# Patient Record
Sex: FEMALE | Race: WHITE | NOT HISPANIC OR LATINO | Employment: OTHER | URBAN - METROPOLITAN AREA
[De-identification: names, ages, dates, MRNs, and addresses within clinical notes are randomized per-mention and may not be internally consistent; named-entity substitution may affect disease eponyms.]

---

## 2017-11-03 ENCOUNTER — TRANSCRIBE ORDERS (OUTPATIENT)
Dept: ADMINISTRATIVE | Facility: HOSPITAL | Age: 67
End: 2017-11-03

## 2017-11-03 ENCOUNTER — APPOINTMENT (OUTPATIENT)
Dept: LAB | Facility: HOSPITAL | Age: 67
End: 2017-11-03
Payer: MEDICARE

## 2017-11-03 DIAGNOSIS — M25.512 LEFT SHOULDER PAIN, UNSPECIFIED CHRONICITY: ICD-10-CM

## 2017-11-03 DIAGNOSIS — E03.8 HYPOTHYROIDISM DUE TO FIBROUS INVASIVE THYROIDITIS: ICD-10-CM

## 2017-11-03 DIAGNOSIS — E06.5 HYPOTHYROIDISM DUE TO FIBROUS INVASIVE THYROIDITIS: ICD-10-CM

## 2017-11-03 DIAGNOSIS — M25.571 RIGHT ANKLE PAIN, UNSPECIFIED CHRONICITY: ICD-10-CM

## 2017-11-03 DIAGNOSIS — G54.6 PHANTOM PAIN (HCC): ICD-10-CM

## 2017-11-03 DIAGNOSIS — E55.9 AVITAMINOSIS D: ICD-10-CM

## 2017-11-03 DIAGNOSIS — M25.541 ARTHRALGIA OF RIGHT HAND: ICD-10-CM

## 2017-11-03 DIAGNOSIS — M25.542 ARTHRALGIA OF LEFT HAND: ICD-10-CM

## 2017-11-03 DIAGNOSIS — G54.6 PHANTOM PAIN (HCC): Primary | ICD-10-CM

## 2017-11-03 DIAGNOSIS — M25.572 LEFT ANKLE PAIN, UNSPECIFIED CHRONICITY: ICD-10-CM

## 2017-11-03 LAB
ALBUMIN SERPL BCP-MCNC: 3.7 G/DL (ref 3.5–5)
ALP SERPL-CCNC: 97 U/L (ref 46–116)
ALT SERPL W P-5'-P-CCNC: 29 U/L (ref 12–78)
ANION GAP SERPL CALCULATED.3IONS-SCNC: 8 MMOL/L (ref 4–13)
AST SERPL W P-5'-P-CCNC: 14 U/L (ref 5–45)
BILIRUB SERPL-MCNC: 0.3 MG/DL (ref 0.2–1)
BUN SERPL-MCNC: 12 MG/DL (ref 5–25)
CALCIUM SERPL-MCNC: 8.9 MG/DL (ref 8.3–10.1)
CHLORIDE SERPL-SCNC: 105 MMOL/L (ref 100–108)
CO2 SERPL-SCNC: 32 MMOL/L (ref 21–32)
CREAT SERPL-MCNC: 0.88 MG/DL (ref 0.6–1.3)
ERYTHROCYTE [SEDIMENTATION RATE] IN BLOOD: 7 MM/HOUR (ref 0–20)
GFR SERPL CREATININE-BSD FRML MDRD: 69 ML/MIN/1.73SQ M
GLUCOSE P FAST SERPL-MCNC: 90 MG/DL (ref 65–99)
POTASSIUM SERPL-SCNC: 4.2 MMOL/L (ref 3.5–5.3)
PROT SERPL-MCNC: 7.3 G/DL (ref 6.4–8.2)
SODIUM SERPL-SCNC: 145 MMOL/L (ref 136–145)
TSH SERPL DL<=0.05 MIU/L-ACNC: 1.87 UIU/ML (ref 0.36–3.74)

## 2017-11-03 PROCEDURE — 80053 COMPREHEN METABOLIC PANEL: CPT

## 2017-11-03 PROCEDURE — 85652 RBC SED RATE AUTOMATED: CPT

## 2017-11-03 PROCEDURE — 36415 COLL VENOUS BLD VENIPUNCTURE: CPT

## 2017-11-03 PROCEDURE — 84443 ASSAY THYROID STIM HORMONE: CPT

## 2017-11-06 ENCOUNTER — HOSPITAL ENCOUNTER (EMERGENCY)
Facility: HOSPITAL | Age: 67
Discharge: HOME | End: 2017-11-06
Admitting: EMERGENCY MEDICINE
Payer: MEDICARE

## 2017-11-06 VITALS
HEART RATE: 79 BPM | SYSTOLIC BLOOD PRESSURE: 162 MMHG | WEIGHT: 168.13 LBS | TEMPERATURE: 98.7 F | OXYGEN SATURATION: 96 % | DIASTOLIC BLOOD PRESSURE: 89 MMHG | RESPIRATION RATE: 20 BRPM

## 2017-11-06 DIAGNOSIS — T18.9XXA FOREIGN BODY, SWALLOWED, INITIAL ENCOUNTER: Primary | ICD-10-CM

## 2017-11-06 LAB
AMPHETAMINES SERPL QL SCN: NEGATIVE
BARBITURATES UR QL: NEGATIVE
BENZODIAZ UR QL: NEGATIVE
COCAINE UR QL: NEGATIVE
METHADONE UR QL: NEGATIVE
OPIATES UR QL SCN: NEGATIVE
PCP UR QL: NEGATIVE
THC UR QL: NEGATIVE

## 2017-11-06 PROCEDURE — 80307 DRUG TEST PRSMV CHEM ANLYZR: CPT | Performed by: NURSE PRACTITIONER

## 2017-11-06 PROCEDURE — 99283 EMERGENCY DEPT VISIT LOW MDM: CPT

## 2017-11-06 RX ORDER — MONTELUKAST SODIUM 10 MG/1
10 TABLET ORAL
COMMUNITY
End: 2021-11-23

## 2017-11-06 RX ORDER — LISINOPRIL 5 MG/1
10 TABLET ORAL DAILY
COMMUNITY
End: 2021-11-23

## 2017-11-06 RX ORDER — IBUPROFEN 600 MG/1
600 TABLET ORAL ONCE
Status: COMPLETED | OUTPATIENT
Start: 2017-11-06 | End: 2017-11-06

## 2017-11-06 RX ORDER — MAG HYDROX/ALUMINUM HYD/SIMETH 400-400-40
1 SUSPENSION, ORAL (FINAL DOSE FORM) ORAL DAILY
COMMUNITY
Start: 2015-08-03

## 2017-11-06 RX ADMIN — IBUPROFEN 600 MG: 600 TABLET ORAL at 10:41

## 2017-11-06 NOTE — ED PROVIDER NOTES
History  Chief Complaint   Patient presents with    Swallowed Foreign Body     Pt presents to ER with c/o's opening a new bottle of soda & an unknow gelatin type capsule was in bottle & pt had ingested some of the capsule  Healthy appearing adult presents in no distress   70-year-old female who is presenting here today with a chief complaint of believing that there was some type of gelatin capsule that was in her [employer]  She is here to find out what that was  She is complaining of beginning to feel a headache  She is requesting urine drug screen although I think this is likely not going to be helpful  She denies any medication consumption that may have appeared like this  This was just prior to arrival and she did bring the capsule with her  Prior to Admission Medications   Prescriptions Last Dose Informant Patient Reported? Taking? Cholecalciferol (VITAMIN D3) 5000 units CAPS   Yes Yes   Sig: Take 1 capsule by mouth daily   lisinopril (ZESTRIL) 5 mg tablet   Yes Yes   Sig: Take 5 mg by mouth daily   montelukast (SINGULAIR) 10 mg tablet   Yes Yes   Sig: Take 10 mg by mouth daily at bedtime      Facility-Administered Medications: None       Past Medical History:   Diagnosis Date    COPD (chronic obstructive pulmonary disease) ([facility] [address] )     Hypertension        History reviewed  No pertinent surgical history  History reviewed  No pertinent family history  I have reviewed and agree with the history as documented  Social History   Substance Use Topics    Smoking status: Unknown If Ever Smoked    Smokeless tobacco: Not on file    Alcohol use Not on file        Review of Systems   Constitutional: Negative for activity change, fatigue and fever  HENT: Negative for congestion, ear pain, rhinorrhea and sore throat  Eyes: Negative  Respiratory: Negative for cough, shortness of breath and wheezing  Gastrointestinal: Negative for abdominal pain, diarrhea, nausea and vomiting  Endocrine: Negative  Genitourinary: Negative for difficulty urinating, dyspareunia, dysuria, flank pain, frequency, menstrual problem, pelvic pain, urgency, vaginal bleeding, vaginal discharge and vaginal pain  Musculoskeletal: Negative for arthralgias and myalgias  Skin: Negative for color change and pallor  Neurological: Negative for dizziness, speech difficulty, weakness and headaches  Hematological: Negative for adenopathy  Psychiatric/Behavioral: Negative for confusion  Physical Exam  ED Triage Vitals   Temperature Pulse Respirations Blood Pressure SpO2   11/06/17 0954 11/06/17 0954 11/06/17 0954 11/06/17 0956 11/06/17 0954   98 7 °F (37 1 °C) 79 20 (!) 171/86 96 %      Temp Source Heart Rate Source Patient Position - Orthostatic VS BP Location FiO2 (%)   11/06/17 0954 11/06/17 0954 -- 11/06/17 0956 --   Oral Monitor  Right arm       Pain Score       --                  Orthostatic Vital Signs  Vitals:    11/06/17 0954 11/06/17 0956 11/06/17 1051   BP:  (!) 171/86 162/89   Pulse: 79         Physical Exam   Constitutional: She is oriented to person, place, and time  She appears well-developed and well-nourished  She is cooperative  Non-toxic appearance  She does not have a sickly appearance  She does not appear ill  No distress  HENT:   Head: Normocephalic and atraumatic  Right Ear: Tympanic membrane and external ear normal    Left Ear: Tympanic membrane and external ear normal    Nose: No rhinorrhea, sinus tenderness or nasal deformity  No epistaxis  Right sinus exhibits no maxillary sinus tenderness and no frontal sinus tenderness  Left sinus exhibits no maxillary sinus tenderness and no frontal sinus tenderness  Mouth/Throat: Oropharynx is clear and moist and mucous membranes are normal  Normal dentition  Eyes: EOM are normal  Pupils are equal, round, and reactive to light  Neck: Normal range of motion  Neck supple     Cardiovascular: Normal rate, regular rhythm and normal heart sounds  No murmur heard  Pulmonary/Chest: Effort normal and breath sounds normal  No accessory muscle usage  No respiratory distress  She has no wheezes  She has no rales  She exhibits no tenderness  Abdominal: Soft  She exhibits no distension  There is no guarding  Musculoskeletal: Normal range of motion  She exhibits no edema or tenderness  Lymphadenopathy:     She has no cervical adenopathy  Neurological: She is alert and oriented to person, place, and time  She exhibits normal muscle tone  Skin: Skin is warm and dry  No rash noted  No erythema  Psychiatric: She has a normal mood and affect  Nursing note and vitals reviewed  ED Medications  Medications   ibuprofen (MOTRIN) tablet 600 mg (600 mg Oral Given 11/6/17 1041)       Diagnostic Studies  Results Reviewed     Procedure Component Value Units Date/Time    Rapid drug screen, urine [[id number]]  (Normal) Collected:  11/06/17 1036    Lab Status:  Final result Specimen:  Urine from Urine, Clean Catch Updated:  11/06/17 1058     Amph/Meth UR Negative     Barbiturate Ur Negative     Benzodiazepine Urine Negative     Cocaine Urine Negative     Methadone Urine Negative     Opiate Urine Negative     PCP Ur Negative     THC Urine Negative    Narrative:         FOR MEDICAL PURPOSES ONLY  IF CONFIRMATION NEEDED PLEASE CONTACT THE LAB WITHIN 5 DAYS  Drug Screen Cutoff Levels:  AMPHETAMINE/METHAMPHETAMINES  1000 ng/mL  BARBITURATES     200 ng/mL  BENZODIAZEPINES     200 ng/mL  COCAINE      300 ng/mL  METHADONE      300 ng/mL  OPIATES      300 ng/mL  PHENCYCLIDINE     25 ng/mL  THC       50 ng/mL                 No orders to display              Procedures  Procedures       Phone Contacts  ED Phone Contact    ED Course  ED Course                                MDM  Number of Diagnoses or Management Options  Foreign body, swallowed, initial encounter: new and requires workup  Diagnosis management comments:  It turns out that capsule that the patient found was her own vitamin-D capsule  Anthony placed one of the vitamin-D capsules into cola and it appears to be doing the same thing as the capsule that she brought in  CritCare Time    Disposition  Final diagnoses:   Foreign body, swallowed, initial encounter     Time reflects when diagnosis was documented in both MDM as applicable and the Disposition within this note     Time User Action Codes Description Comment    11/6/2017 10:48 AM Theodora Medicus  9XXA] Foreign body, swallowed, initial encounter       ED Disposition     ED Disposition Condition Comment    Discharge  Shellie Claude discharge to home/self care  Condition at discharge: Good        Follow-up Information    None       Discharge Medication List as of 11/6/2017 10:49 AM      CONTINUE these medications which have NOT CHANGED    Details   Cholecalciferol (VITAMIN D3) 5000 units CAPS Take 1 capsule by mouth daily, Starting Mon 8/3/2015, Historical Med      lisinopril (ZESTRIL) 5 mg tablet Take 5 mg by mouth daily, Historical Med      montelukast (SINGULAIR) 10 mg tablet Take 10 mg by mouth daily at bedtime, Historical Med           No discharge procedures on file      ED Provider  Electronically Signed by           BONI Dudley  11/06/17 6692

## 2020-07-23 ENCOUNTER — APPOINTMENT (OUTPATIENT)
Dept: LAB | Facility: HOSPITAL | Age: 70
End: 2020-07-23
Payer: MEDICARE

## 2020-07-23 ENCOUNTER — TRANSCRIBE ORDERS (OUTPATIENT)
Dept: ADMINISTRATIVE | Facility: HOSPITAL | Age: 70
End: 2020-07-23

## 2020-07-23 ENCOUNTER — HOSPITAL ENCOUNTER (OUTPATIENT)
Dept: RADIOLOGY | Facility: HOSPITAL | Age: 70
Discharge: HOME | End: 2020-07-23
Payer: MEDICARE

## 2020-07-23 DIAGNOSIS — F41.1 GENERALIZED ANXIETY DISORDER: ICD-10-CM

## 2020-07-23 DIAGNOSIS — Z13.6 SCREENING FOR CARDIOVASCULAR CONDITION: ICD-10-CM

## 2020-07-23 DIAGNOSIS — K59.09 OTHER CONSTIPATION: ICD-10-CM

## 2020-07-23 DIAGNOSIS — M25.562 LEFT KNEE PAIN, UNSPECIFIED CHRONICITY: ICD-10-CM

## 2020-07-23 DIAGNOSIS — F33.1 MAJOR DEPRESSIVE DISORDER, RECURRENT EPISODE, MODERATE (HCC): Primary | ICD-10-CM

## 2020-07-23 DIAGNOSIS — F33.1 MAJOR DEPRESSIVE DISORDER, RECURRENT EPISODE, MODERATE (HCC): ICD-10-CM

## 2020-07-23 LAB
ALBUMIN SERPL BCP-MCNC: 4.1 G/DL (ref 3.5–5)
ALP SERPL-CCNC: 79 U/L (ref 46–116)
ALT SERPL W P-5'-P-CCNC: 13 U/L (ref 12–78)
ANION GAP SERPL CALCULATED.3IONS-SCNC: 9 MMOL/L (ref 4–13)
AST SERPL W P-5'-P-CCNC: 11 U/L (ref 5–45)
BASOPHILS # BLD AUTO: 0.02 THOUSANDS/ΜL (ref 0–0.1)
BASOPHILS NFR BLD AUTO: 0 % (ref 0–1)
BILIRUB SERPL-MCNC: 0.4 MG/DL (ref 0.2–1)
BUN SERPL-MCNC: 14 MG/DL (ref 5–25)
CALCIUM SERPL-MCNC: 9 MG/DL (ref 8.3–10.1)
CHLORIDE SERPL-SCNC: 106 MMOL/L (ref 100–108)
CHOLEST SERPL-MCNC: 218 MG/DL (ref 50–200)
CO2 SERPL-SCNC: 27 MMOL/L (ref 21–32)
CREAT SERPL-MCNC: 0.85 MG/DL (ref 0.6–1.3)
EOSINOPHIL # BLD AUTO: 0.08 THOUSAND/ΜL (ref 0–0.61)
EOSINOPHIL NFR BLD AUTO: 1 % (ref 0–6)
ERYTHROCYTE [DISTWIDTH] IN BLOOD BY AUTOMATED COUNT: 12.9 % (ref 11.6–15.1)
GFR SERPL CREATININE-BSD FRML MDRD: 71 ML/MIN/1.73SQ M
GLUCOSE P FAST SERPL-MCNC: 90 MG/DL (ref 65–99)
HCT VFR BLD AUTO: 44.4 % (ref 34.8–46.1)
HDLC SERPL-MCNC: 47 MG/DL
HGB BLD-MCNC: 14.2 G/DL (ref 11.5–15.4)
IMM GRANULOCYTES # BLD AUTO: 0.02 THOUSAND/UL (ref 0–0.2)
IMM GRANULOCYTES NFR BLD AUTO: 0 % (ref 0–2)
LDLC SERPL CALC-MCNC: 137 MG/DL (ref 0–100)
LYMPHOCYTES # BLD AUTO: 2.23 THOUSANDS/ΜL (ref 0.6–4.47)
LYMPHOCYTES NFR BLD AUTO: 37 % (ref 14–44)
MCH RBC QN AUTO: 31.6 PG (ref 26.8–34.3)
MCHC RBC AUTO-ENTMCNC: 32 G/DL (ref 31.4–37.4)
MCV RBC AUTO: 99 FL (ref 82–98)
MONOCYTES # BLD AUTO: 0.39 THOUSAND/ΜL (ref 0.17–1.22)
MONOCYTES NFR BLD AUTO: 7 % (ref 4–12)
NEUTROPHILS # BLD AUTO: 3.26 THOUSANDS/ΜL (ref 1.85–7.62)
NEUTS SEG NFR BLD AUTO: 55 % (ref 43–75)
NONHDLC SERPL-MCNC: 171 MG/DL
NRBC BLD AUTO-RTO: 0 /100 WBCS
PLATELET # BLD AUTO: 319 THOUSANDS/UL (ref 149–390)
PMV BLD AUTO: 9.6 FL (ref 8.9–12.7)
POTASSIUM SERPL-SCNC: 4.2 MMOL/L (ref 3.5–5.3)
PROT SERPL-MCNC: 7.9 G/DL (ref 6.4–8.2)
RBC # BLD AUTO: 4.5 MILLION/UL (ref 3.81–5.12)
SODIUM SERPL-SCNC: 142 MMOL/L (ref 136–145)
TRIGL SERPL-MCNC: 169 MG/DL
TSH SERPL DL<=0.05 MIU/L-ACNC: 1.29 UIU/ML (ref 0.36–3.74)
WBC # BLD AUTO: 6 THOUSAND/UL (ref 4.31–10.16)

## 2020-07-23 PROCEDURE — 73564 X-RAY EXAM KNEE 4 OR MORE: CPT

## 2020-07-23 PROCEDURE — 80053 COMPREHEN METABOLIC PANEL: CPT

## 2020-07-23 PROCEDURE — 36415 COLL VENOUS BLD VENIPUNCTURE: CPT

## 2020-07-23 PROCEDURE — 80061 LIPID PANEL: CPT

## 2020-07-23 PROCEDURE — 85025 COMPLETE CBC W/AUTO DIFF WBC: CPT

## 2020-07-23 PROCEDURE — 84443 ASSAY THYROID STIM HORMONE: CPT

## 2020-07-24 ENCOUNTER — HOSPITAL ENCOUNTER (EMERGENCY)
Facility: HOSPITAL | Age: 70
Discharge: HOME | End: 2020-07-24
Attending: EMERGENCY MEDICINE | Admitting: EMERGENCY MEDICINE
Payer: MEDICARE

## 2020-07-24 VITALS
RESPIRATION RATE: 18 BRPM | OXYGEN SATURATION: 97 % | SYSTOLIC BLOOD PRESSURE: 124 MMHG | HEIGHT: 64 IN | WEIGHT: 160.72 LBS | TEMPERATURE: 97.9 F | DIASTOLIC BLOOD PRESSURE: 99 MMHG | HEART RATE: 84 BPM | BODY MASS INDEX: 27.44 KG/M2

## 2020-07-24 DIAGNOSIS — S82.002A LEFT PATELLA FRACTURE: Primary | ICD-10-CM

## 2020-07-24 PROCEDURE — 99283 EMERGENCY DEPT VISIT LOW MDM: CPT

## 2020-07-24 PROCEDURE — 99284 EMERGENCY DEPT VISIT MOD MDM: CPT | Performed by: PHYSICIAN ASSISTANT

## 2020-07-24 NOTE — DISCHARGE INSTRUCTIONS
Use crutches and knee immobilizer until seen by orthopedics  Take Tylenol as needed for pain  Call orthopedics on Monday to schedule a follow-up appointment  Return to ER with worsening symptoms

## 2020-07-24 NOTE — ED PROVIDER NOTES
History  Chief Complaint   Patient presents with    Knee Injury     Patient presents to ED with co left knee pain after falling 2 weeks ago  Patient reports she had xray done yesterday and was called and told today that she fractured her knee to go to ER  66yo female with a history of hypertension and COPD presenting for evaluation of left knee pain x 2 weeks  Patient fell on her left knee two weeks ago  She describes "tweaking it" a few days later walking up the steps  Patient has been able to ambulate since that time but pain and swelling continued  She was seen by her PCP 2 days ago for the same  Patient was sent for x-rays  X-ray revealed a nondisplaced anterior patella fracture  Patient was referred to the ER for further management  She denies numbness, tingling, and other injuries  History provided by:  Patient and medical records   used: No    Knee Pain   Location:  Knee  Time since incident:  2 weeks  Injury: yes    Mechanism of injury: fall    Fall:     Fall occurred:  Walking    Height of fall:  Ground level    Point of impact:  Knees  Knee location:  L knee  Pain details:     Quality:  Aching    Radiates to:  Does not radiate    Severity:  Mild    Onset quality:  Gradual    Duration:  2 weeks    Timing:  Constant    Progression:  Unchanged  Chronicity:  New  Dislocation: no    Prior injury to area:  No  Relieved by:  Nothing  Worsened by:  Nothing  Ineffective treatments:  None tried  Associated symptoms: swelling    Associated symptoms: no back pain, no decreased ROM, no fatigue, no fever, no itching, no muscle weakness, no neck pain, no numbness, no stiffness and no tingling        Prior to Admission Medications   Prescriptions Last Dose Informant Patient Reported? Taking?    Cholecalciferol (VITAMIN D3) 5000 units CAPS 7/24/2020 at Unknown time  Yes Yes   Sig: Take 1 capsule by mouth daily   lisinopril (ZESTRIL) 5 mg tablet 7/24/2020 at Unknown time  Yes Yes   Sig: Take 5 mg by mouth daily   montelukast (SINGULAIR) 10 mg tablet Not Taking at Unknown time  Yes No   Sig: Take 10 mg by mouth daily at bedtime      Facility-Administered Medications: None       Past Medical History:   Diagnosis Date    COPD [facility]chronic obstructive pulmonary disease) ([facility] [address] )     Hypertension        History reviewed  No pertinent surgical history  History reviewed  No pertinent family history  I have reviewed and agree with the history as documented  E-Cigarette/Vaping    E-Cigarette Use Never User      E-Cigarette/Vaping Substances     Social History     Tobacco Use    Smoking status: Unknown If Ever Smoked    Smokeless tobacco: Never Used   Substance Use Topics    Alcohol use: Not Currently    Drug use: Not Currently       Review of Systems   Constitutional: Negative for chills, fatigue and fever  Eyes: Negative for discharge and redness  Respiratory: Negative for shortness of breath and stridor  Cardiovascular: Negative for chest pain and palpitations  Gastrointestinal: Negative for abdominal pain and vomiting  Musculoskeletal: Positive for arthralgias  Negative for back pain, neck pain and stiffness  Skin: Negative for color change, itching and wound  Neurological: Negative for weakness and numbness  Psychiatric/Behavioral: Negative for confusion  All other systems reviewed and are negative  Physical Exam  Physical Exam   Constitutional: She appears well-developed and well-nourished  No distress  Non-toxic appearing   HENT:   Head: Normocephalic and atraumatic  Right Ear: External ear normal    Left Ear: External ear normal    Nose: Nose normal    Mouth/Throat: Oropharynx is clear and moist    Eyes: Conjunctivae are normal  Right eye exhibits no discharge  Left eye exhibits no discharge  No scleral icterus  Neck: Normal range of motion  Neck supple  Cardiovascular: Normal rate, regular rhythm and normal heart sounds  No murmur heard    Pulmonary/Chest: Effort normal and breath sounds normal  No stridor  No respiratory distress  She has no wheezes  She has no rales  Abdominal: Soft  Bowel sounds are normal  She exhibits no distension  There is no tenderness  There is no guarding  Musculoskeletal: Normal range of motion  She exhibits no edema or deformity  Left knee: She exhibits swelling  She exhibits normal range of motion, no deformity, no laceration and no erythema  Left knee: Pain at patella with mild soft tissue swelling  ROM intact  No deformities or lacerations  Able to ambulate without difficulty  2+ DP pulse  Distal sensation intact  Lymphadenopathy:     She has no cervical adenopathy  Neurological: She is alert  She is not disoriented  GCS eye subscore is 4  GCS verbal subscore is 5  GCS motor subscore is 6  Skin: Skin is warm and dry  She is not diaphoretic  Psychiatric: She has a normal mood and affect  Her behavior is normal    Nursing note and vitals reviewed  Vital Signs  ED Triage Vitals [07/24/20 1857]   Temperature Pulse Respirations Blood Pressure SpO2   97 9 °F (36 6 °C) 84 18 124/99 97 %      Temp Source Heart Rate Source Patient Position - Orthostatic VS BP Location FiO2 (%)   Oral Monitor Sitting Left arm --      Pain Score       --           Vitals:    07/24/20 1857   BP: 124/99   Pulse: 84   Patient Position - Orthostatic VS: Sitting         Visual Acuity      ED Medications  Medications - No data to display    Diagnostic Studies  Results Reviewed     None                 No orders to display              Procedures  Procedures         ED Course       US AUDIT      Most Recent Value   Initial Alcohol Screen: US AUDIT-C    1  How often do you have a drink containing alcohol?  0 Filed at: 07/24/2020 1911   2  How many drinks containing alcohol do you have on a typical day you are drinking? 0 Filed at: 07/24/2020 1911   3b  FEMALE Any Age, or MALE 65+: How often do you have 4 or more drinks on one occassion?   0 Filed at: 07/24/2020 1911   Audit-C Score  0 Filed at: 07/24/2020 1911              Identification of Seniors at Risk      Most Recent Value   (ISAR) Identification of Seniors at Risk   Before the illness or injury that brought you to the Emergency, did you need someone to help you on a regular basis? 0 Filed at: 07/24/2020 1900   In the last 24 hours, have you needed more help than usual?  0 Filed at: 07/24/2020 1900   Have you been hospitalized for one or more nights during the past 6 months? 0 Filed at: 07/24/2020 1900   In general, do you see well?  0 Filed at: 07/24/2020 1900   In general, do you have serious problems with your memory? 0 Filed at: 07/24/2020 1900   Do you take more than three different medications every day? 1 Filed at: 07/24/2020 1900   ISAR Score  1 Filed at: 07/24/2020 1900          SARAH/DAST-10      Most Recent Value   How many times in the past year have you    Used an illegal drug or used a prescription medication for non-medical reasons? Never Filed at: 07/24/2020 1911                                MDM  Number of Diagnoses or Management Options  Left patella fracture: new and does not require workup  Diagnosis management comments: 72-year-old female presenting for left knee pain x2 weeks after a fall  She was sent for outpatient x-rays yesterday  X-rays revealed a nondisplaced patellar fracture  She was referred to the ER for further evaluation  On exam, there is mild pain on palpation of the patella with mild soft tissue swelling  Range of motion is normal   Patient is able to bear weight without difficulty  Extremity is neurovascularly intact  Knee immobilizer and crutches provided  Advised orthopedics follow-up for further management  ED return precautions discussed  Patient expressed understanding and is agreeable to plan  Patient discharged in stable condition           Amount and/or Complexity of Data Reviewed  Tests in the radiology section of CPT®: reviewed  Independent visualization of images, tracings, or specimens: yes    Risk of Complications, Morbidity, and/or Mortality  Presenting problems: low  Diagnostic procedures: low  Management options: low    Patient Progress  Patient progress: stable        Disposition  Final diagnoses:   Left patella fracture     Time reflects when diagnosis was documented in both MDM as applicable and the Disposition within this note     Time User Action Codes Description Comment    7/24/2020  7:08  [facility] [address], [address] Left patella fracture       ED Disposition     ED Disposition Condition Date/Time Comment    Discharge Stable Fri Jul 24, 2020  7:08 PM Renee Nicholson discharge to home/self care  Follow-up Information     Follow up With Specialties Details Why Contact Info Additional [address] [facility], [place] Orthopedic Surgery Schedule an appointment as soon as possible for a visit   [place] [address]  [address]  [facility] [phone]       [facility] Emergency Department Emergency Medicine  If symptoms worsen [address] [facility] [id number]  [address] [place] [address] ED, [address], [address]          Discharge Medication List as of 7/24/2020  7:09 PM      CONTINUE these medications which have NOT CHANGED    Details   Cholecalciferol (VITAMIN D3) 5000 units CAPS Take 1 capsule by mouth daily, Starting Mon 8/3/2015, Historical Med      lisinopril (ZESTRIL) 5 mg tablet Take 5 mg by mouth daily, Historical Med      montelukast (SINGULAIR) 10 mg tablet Take 10 mg by mouth daily at bedtime, Historical Med           No discharge procedures on file      PDMP Review     None          ED Provider  Electronically Signed by           Lilli Mayo PA-C  07/24/20 1142

## 2021-08-23 ENCOUNTER — APPOINTMENT (EMERGENCY)
Dept: RADIOLOGY | Facility: HOSPITAL | Age: 71
End: 2021-08-23
Payer: MEDICARE

## 2021-08-23 ENCOUNTER — HOSPITAL ENCOUNTER (EMERGENCY)
Facility: HOSPITAL | Age: 71
Discharge: HOME | End: 2021-08-23
Attending: EMERGENCY MEDICINE | Admitting: EMERGENCY MEDICINE
Payer: MEDICARE

## 2021-08-23 VITALS
OXYGEN SATURATION: 98 % | RESPIRATION RATE: 20 BRPM | HEART RATE: 67 BPM | TEMPERATURE: 97.8 F | DIASTOLIC BLOOD PRESSURE: 98 MMHG | SYSTOLIC BLOOD PRESSURE: 168 MMHG

## 2021-08-23 DIAGNOSIS — R07.9 CHEST PAIN: Primary | ICD-10-CM

## 2021-08-23 DIAGNOSIS — F41.9 ANXIETY: ICD-10-CM

## 2021-08-23 LAB
ALBUMIN SERPL BCP-MCNC: 3.2 G/DL (ref 3.5–5)
ALP SERPL-CCNC: 83 U/L (ref 46–116)
ALT SERPL W P-5'-P-CCNC: 23 U/L (ref 12–78)
ANION GAP SERPL CALCULATED.3IONS-SCNC: 5 MMOL/L (ref 4–13)
AST SERPL W P-5'-P-CCNC: 10 U/L (ref 5–45)
ATRIAL RATE: 65 BPM
BASOPHILS # BLD MANUAL: 0 THOUSAND/UL (ref 0–0.1)
BASOPHILS NFR MAR MANUAL: 0 % (ref 0–1)
BILIRUB SERPL-MCNC: 0.22 MG/DL (ref 0.2–1)
BUN SERPL-MCNC: 15 MG/DL (ref 5–25)
CALCIUM ALBUM COR SERPL-MCNC: 9.1 MG/DL (ref 8.3–10.1)
CALCIUM SERPL-MCNC: 8.5 MG/DL (ref 8.3–10.1)
CHLORIDE SERPL-SCNC: 104 MMOL/L (ref 100–108)
CO2 SERPL-SCNC: 34 MMOL/L (ref 21–32)
CREAT SERPL-MCNC: 1.01 MG/DL (ref 0.6–1.3)
EOSINOPHIL # BLD MANUAL: 0 THOUSAND/UL (ref 0–0.4)
EOSINOPHIL NFR BLD MANUAL: 0 % (ref 0–6)
ERYTHROCYTE [DISTWIDTH] IN BLOOD BY AUTOMATED COUNT: 12.8 % (ref 11.6–15.1)
GFR SERPL CREATININE-BSD FRML MDRD: 57 ML/MIN/1.73SQ M
GLUCOSE SERPL-MCNC: 94 MG/DL (ref 65–140)
HCT VFR BLD AUTO: 38.7 % (ref 34.8–46.1)
HGB BLD-MCNC: 12.6 G/DL (ref 11.5–15.4)
LYMPHOCYTES # BLD AUTO: 3.53 THOUSAND/UL (ref 0.6–4.47)
LYMPHOCYTES # BLD AUTO: 34 % (ref 14–44)
MCH RBC QN AUTO: 31.7 PG (ref 26.8–34.3)
MCHC RBC AUTO-ENTMCNC: 32.6 G/DL (ref 31.4–37.4)
MCV RBC AUTO: 98 FL (ref 82–98)
METAMYELOCYTES NFR BLD MANUAL: 1 % (ref 0–1)
MONOCYTES # BLD AUTO: 0.62 THOUSAND/UL (ref 0–1.22)
MONOCYTES NFR BLD: 6 % (ref 4–12)
NEUTROPHILS # BLD MANUAL: 5.61 THOUSAND/UL (ref 1.85–7.62)
NEUTS BAND NFR BLD MANUAL: 2 % (ref 0–8)
NEUTS SEG NFR BLD AUTO: 52 % (ref 43–75)
NT-PROBNP SERPL-MCNC: 622 PG/ML
P AXIS: 72 DEGREES
PLATELET # BLD AUTO: 372 THOUSANDS/UL (ref 149–390)
PLATELET BLD QL SMEAR: ADEQUATE
PMV BLD AUTO: 9.2 FL (ref 8.9–12.7)
POTASSIUM SERPL-SCNC: 3.7 MMOL/L (ref 3.5–5.3)
PR INTERVAL: 144 MS
PROT SERPL-MCNC: 6.4 G/DL (ref 6.4–8.2)
QRS AXIS: 22 DEGREES
QRSD INTERVAL: 74 MS
QT INTERVAL: 380 MS
QTC INTERVAL: 395 MS
RBC # BLD AUTO: 3.97 MILLION/UL (ref 3.81–5.12)
SODIUM SERPL-SCNC: 143 MMOL/L (ref 136–145)
T WAVE AXIS: 59 DEGREES
TROPONIN I SERPL-MCNC: 0.03 NG/ML
TROPONIN I SERPL-MCNC: <0.02 NG/ML
VARIANT LYMPHS # BLD AUTO: 5 %
VENTRICULAR RATE: 65 BPM
WBC # BLD AUTO: 10.39 THOUSAND/UL (ref 4.31–10.16)

## 2021-08-23 PROCEDURE — 84484 ASSAY OF TROPONIN QUANT: CPT | Performed by: EMERGENCY MEDICINE

## 2021-08-23 PROCEDURE — 80053 COMPREHEN METABOLIC PANEL: CPT | Performed by: EMERGENCY MEDICINE

## 2021-08-23 PROCEDURE — 36415 COLL VENOUS BLD VENIPUNCTURE: CPT | Performed by: EMERGENCY MEDICINE

## 2021-08-23 PROCEDURE — 85027 COMPLETE CBC AUTOMATED: CPT | Performed by: EMERGENCY MEDICINE

## 2021-08-23 PROCEDURE — 94640 AIRWAY INHALATION TREATMENT: CPT

## 2021-08-23 PROCEDURE — 99284 EMERGENCY DEPT VISIT MOD MDM: CPT

## 2021-08-23 PROCEDURE — 93005 ELECTROCARDIOGRAM TRACING: CPT

## 2021-08-23 PROCEDURE — 71046 X-RAY EXAM CHEST 2 VIEWS: CPT

## 2021-08-23 PROCEDURE — 83880 ASSAY OF NATRIURETIC PEPTIDE: CPT | Performed by: EMERGENCY MEDICINE

## 2021-08-23 PROCEDURE — 85007 BL SMEAR W/DIFF WBC COUNT: CPT | Performed by: EMERGENCY MEDICINE

## 2021-08-23 PROCEDURE — 93010 ELECTROCARDIOGRAM REPORT: CPT | Performed by: INTERNAL MEDICINE

## 2021-08-23 PROCEDURE — 96374 THER/PROPH/DIAG INJ IV PUSH: CPT

## 2021-08-23 PROCEDURE — 99284 EMERGENCY DEPT VISIT MOD MDM: CPT | Performed by: EMERGENCY MEDICINE

## 2021-08-23 RX ORDER — ALBUTEROL SULFATE 2.5 MG/3ML
5 SOLUTION RESPIRATORY (INHALATION) ONCE
Status: COMPLETED | OUTPATIENT
Start: 2021-08-23 | End: 2021-08-23

## 2021-08-23 RX ORDER — DEXAMETHASONE SODIUM PHOSPHATE 10 MG/ML
10 INJECTION, SOLUTION INTRAMUSCULAR; INTRAVENOUS ONCE
Status: COMPLETED | OUTPATIENT
Start: 2021-08-23 | End: 2021-08-23

## 2021-08-23 RX ORDER — MAGNESIUM HYDROXIDE/ALUMINUM HYDROXICE/SIMETHICONE 120; 1200; 1200 MG/30ML; MG/30ML; MG/30ML
30 SUSPENSION ORAL ONCE
Status: COMPLETED | OUTPATIENT
Start: 2021-08-23 | End: 2021-08-23

## 2021-08-23 RX ORDER — ACETAMINOPHEN 325 MG/1
650 TABLET ORAL ONCE
Status: COMPLETED | OUTPATIENT
Start: 2021-08-23 | End: 2021-08-23

## 2021-08-23 RX ORDER — LORAZEPAM 0.5 MG/1
0.5 TABLET ORAL ONCE
Status: COMPLETED | OUTPATIENT
Start: 2021-08-23 | End: 2021-08-23

## 2021-08-23 RX ADMIN — ALBUTEROL SULFATE 5 MG: 2.5 SOLUTION RESPIRATORY (INHALATION) at 03:06

## 2021-08-23 RX ADMIN — ACETAMINOPHEN 650 MG: 325 TABLET, FILM COATED ORAL at 03:06

## 2021-08-23 RX ADMIN — ALUMINA, MAGNESIA, AND SIMETHICONE ORAL SUSPENSION REGULAR STRENGTH 30 ML: 1200; 1200; 120 SUSPENSION ORAL at 03:06

## 2021-08-23 RX ADMIN — DEXAMETHASONE SODIUM PHOSPHATE 10 MG: 10 INJECTION, SOLUTION INTRAMUSCULAR; INTRAVENOUS at 03:06

## 2021-08-23 RX ADMIN — LORAZEPAM 0.5 MG: 0.5 TABLET ORAL at 03:06
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
## 2021-09-10 ENCOUNTER — OFFICE VISIT (OUTPATIENT)
Dept: CARDIOLOGY CLINIC | Facility: CLINIC | Age: 71
End: 2021-09-10
Payer: MEDICARE

## 2021-09-10 VITALS
SYSTOLIC BLOOD PRESSURE: 112 MMHG | OXYGEN SATURATION: 98 % | BODY MASS INDEX: 29.7 KG/M2 | WEIGHT: 173 LBS | HEART RATE: 97 BPM | DIASTOLIC BLOOD PRESSURE: 88 MMHG

## 2021-09-10 DIAGNOSIS — I20.9 ANGINA PECTORIS (HCC): Primary | ICD-10-CM

## 2021-09-10 DIAGNOSIS — R06.00 DYSPNEA: ICD-10-CM

## 2021-09-10 DIAGNOSIS — I10 ESSENTIAL HYPERTENSION: ICD-10-CM

## 2021-09-10 PROCEDURE — 93000 ELECTROCARDIOGRAM COMPLETE: CPT | Performed by: INTERNAL MEDICINE

## 2021-09-10 PROCEDURE — 99214 OFFICE O/P EST MOD 30 MIN: CPT | Performed by: INTERNAL MEDICINE

## 2021-09-10 RX ORDER — GABAPENTIN 100 MG/1
100 CAPSULE ORAL
COMMUNITY
Start: 2021-06-24 | End: 2022-06-24

## 2021-09-10 RX ORDER — ACETAMINOPHEN 500 MG
TABLET ORAL
COMMUNITY

## 2021-09-10 RX ORDER — PREDNISONE 20 MG/1
TABLET ORAL
COMMUNITY
Start: 2021-09-09 | End: 2021-11-23

## 2021-09-10 NOTE — PROGRESS NOTES
Cardiology Consultation     Kavon Shell  [id number]  1950  [facility] [place]  [id number] [place] [address]    HPI:  76-year-old single lady with multiple medical problems including COPD due to former smoking, positive rheumatoid factor, hypertension, who is referred for cardiac evaluation  She states that she has had marked increase in shortness of breath as of late  She does sleep on 1 pillow at night  She also has noticed, with the shortness of breath, that she gets a "burning" in the chest  and it "hurts  "  This only occurs with exertion  She said she had a heart catheterization many years ago and was told there were "no blockages"  There is  A family history of heart disease  LDL cholesterol is 132 and HDL is 62  She has a history of hypertension  1  Angina pectoris ([address] )  -     NM myocardial perfusion spect (rx stress and/or rest); Future; Expected date: 09/10/2021    2  Essential hypertension  -     POCT ECG    3  Dyspnea  -     Echo complete with contrast if indicated; Future; Expected date: 09/10/2021      There is no problem list on file for this patient      Past Medical History:   Diagnosis Date    COPD (chronic obstructive pulmonary disease) ([facility] [address] )     Hypertension      Social History     Socioeconomic History    Marital status: Single     Spouse name: Not on file    Number of children: Not on file    Years of education: Not on file    Highest education level: Not on file   Occupational History    Not on file   Tobacco Use    Smoking status: Never Smoker    Smokeless tobacco: Never Used   Vaping Use    Vaping Use: Never used   Substance and Sexual Activity    Alcohol use: Not Currently    Drug use: Not Currently    Sexual activity: Not Currently   Other Topics Concern    Not on file   Social History Narrative    Not on file     Social Determinants of Health Financial Resource Strain:     Difficulty of Paying Living Expenses:    Food Insecurity:     Worried About Running Out of Food in the Last Year:     [address] in the Last Year:    Transportation Needs:     Lack of Transportation (Medical):  Lack of Transportation (Non-Medical):    Physical Activity:     Days of Exercise per Week:     Minutes of Exercise per Session:    Stress:     Feeling of Stress :    Social Connections:     Frequency of Communication with Friends and Family:     Frequency of Social Gatherings with Friends and Family:     Attends [employer] Services:     Active Member of Clubs or Organizations:     Attends Club or Organization Meetings:     Marital Status:    Intimate Partner Violence:     Fear of Current or Ex-Partner:     Emotionally Abused:     Physically Abused:     Sexually Abused:       Family History   Problem Relation Age of Onset    Hypertension Mother     Heart disease Mother      History reviewed  No pertinent surgical history      Current Outpatient Medications:     Cholecalciferol (VITAMIN D3) 5000 units CAPS, Take 1 capsule by mouth daily, Disp: , Rfl:     gabapentin (NEURONTIN) 100 mg capsule, Take 100 mg by mouth, Disp: , Rfl:     lisinopril (ZESTRIL) 5 mg tablet, Take 10 mg by mouth daily , Disp: , Rfl:     predniSONE 20 mg tablet, Take 1 tab 3x daily for 4 days then 2x daily for 4 days then once daily for 4 days, Disp: , Rfl:     acetaminophen (TYLENOL) 500 mg tablet, Acetaminophen TABS   Refills: 0     Active, Disp: , Rfl:     montelukast (SINGULAIR) 10 mg tablet, Take 10 mg by mouth daily at bedtime (Patient not taking: Reported on 9/10/2021), Disp: , Rfl:   Allergies   Allergen Reactions    Amoxicillin Hives    Sulfa Antibiotics Hives     Vitals:    09/10/21 1323   BP: 112/88   BP Location: Left arm   Patient Position: Sitting   Cuff Size: Standard   Pulse: 97   SpO2: 98%   Weight: 78 5 kg (173 lb)       Labs:  Admission on 08/23/2021, Discharged on 08/23/2021   Component Date Value    Sodium 08/23/2021 143     Potassium 08/23/2021 3 7     Chloride 08/23/2021 104     CO2 08/23/2021 34*    ANION GAP 08/23/2021 5     BUN 08/23/2021 15     Creatinine 08/23/2021 1 01     Glucose 08/23/2021 94     Calcium 08/23/2021 8 5     Corrected Calcium 08/23/2021 9 1     AST 08/23/2021 10     ALT 08/23/2021 23     Alkaline Phosphatase 08/23/2021 83     Total Protein 08/23/2021 6 4     Albumin 08/23/2021 3 2*    Total Bilirubin 08/23/2021 0 22     eGFR 08/23/2021 57     WBC 08/23/2021 10 39*    RBC 08/23/2021 3 97     Hemoglobin 08/23/2021 12 6     Hematocrit 08/23/2021 38 7     MCV 08/23/2021 98     MCH 08/23/2021 31 7     MCHC 08/23/2021 32 6     RDW 08/23/2021 12 8     MPV 08/23/2021 9 2     Platelets 32/46/1109 372     Troponin I 08/23/2021 <0 02     NT-proBNP 08/23/2021 622*    Segmented % 08/23/2021 52     Bands % 08/23/2021 2     Lymphocytes % 08/23/2021 34     Monocytes % 08/23/2021 6     Eosinophils, % 08/23/2021 0     Basophils % 08/23/2021 0     Metamyelocytes% 08/23/2021 1     Atypical Lymphocytes % 08/23/2021 5*    Absolute Neutrophils 08/23/2021 5 61     Lymphocytes Absolute 08/23/2021 3 53     Monocytes Absolute 08/23/2021 0 62     Eosinophils Absolute 08/23/2021 0 00     Basophils Absolute 08/23/2021 0 00     Platelet Estimate 34/60/8554 Adequate     Troponin I 08/23/2021 0 03     Ventricular Rate 08/23/2021 65     Atrial Rate 08/23/2021 65     PA Interval 08/23/2021 144     QRSD Interval 08/23/2021 74     QT Interval 08/23/2021 380     QTC Interval 08/23/2021 395     P Axis 08/23/2021 72     QRS Axis 08/23/2021 22     T Wave Axis 08/23/2021 59      Imaging: XR chest 2 views    Result Date: 8/23/2021  Narrative: CHEST INDICATION:   cp  COMPARISON:  None EXAM PERFORMED/VIEWS:  XR CHEST PA & LATERAL  DUAL ENERGY SUBTRACTION  FINDINGS: Cardiomediastinal silhouette appears unremarkable   The lungs are clear   No pneumothorax or pleural effusion  Cholecystectomy  Osseous structures appear within normal limits for patient age  Impression: No acute cardiopulmonary disease  Workstation performed: [id number]       Review of Systems:  Review of Systems   Constitutional: Negative for appetite change and fever  Eyes: Negative for visual disturbance  Respiratory: Negative for cough, chest tightness and shortness of breath  Cardiovascular: Negative for chest pain, palpitations and leg swelling  Genitourinary: Negative for hematuria and menstrual problem  Musculoskeletal: Negative for arthralgias and back pain  She has hip pain and is taking prednisone for it   Skin: Negative for color change and rash  Neurological: Negative for syncope and light-headedness  All other systems reviewed and are negative  Physical Exam:    112/88  Skin warm and dry  Pupils equal   Carotids 2+ without bruits  Neck veins not distended  Lungs are clear today  Rhythm is regular  There is a short grade 1 of 6 systolic ejection murmur at the apex  Regular rhythm  No abdominal masses  Femoral pulse 2 +  No edema  Good strength in all extremities  Discussion/Summary:    1  Exertional burning discomfort and shortness of breath possible angina  2  History of COPD    Recommendations:    1  Pharmacologic stress test   2  Echocardiogram  3  Return after testing is done              Priyanka Diana MD

## 2021-10-14 ENCOUNTER — HOSPITAL ENCOUNTER (OUTPATIENT)
Dept: NON INVASIVE DIAGNOSTICS | Facility: CLINIC | Age: 71
Discharge: HOME | End: 2021-10-14
Payer: MEDICARE

## 2021-10-14 VITALS
BODY MASS INDEX: 29.53 KG/M2 | HEART RATE: 72 BPM | HEIGHT: 64 IN | SYSTOLIC BLOOD PRESSURE: 112 MMHG | WEIGHT: 173 LBS | DIASTOLIC BLOOD PRESSURE: 88 MMHG

## 2021-10-14 DIAGNOSIS — R06.00 DYSPNEA: ICD-10-CM

## 2021-10-14 DIAGNOSIS — I20.9 ANGINA PECTORIS (HCC): ICD-10-CM

## 2021-10-14 LAB
AORTIC ROOT: 3 CM
APICAL FOUR CHAMBER EJECTION FRACTION: 65 %
ARRHY DURING EX: NORMAL
AV LVOT PEAK GRADIENT: 5 MMHG
AV PEAK GRADIENT: 5 MMHG
BASELINE ST DEPRESSION: 0 MM
CHEST PAIN STATEMENT: NORMAL
DOP CALC LVOT AREA: 3.14 CM2
DOP CALC LVOT DIAMETER: 2 CM
E WAVE DECELERATION TIME: 204 MS
FRACTIONAL SHORTENING: 34 % (ref 28–44)
INTERVENTRICULAR SEPTUM IN DIASTOLE (PARASTERNAL SHORT AXIS VIEW): 0.7 CM
LEFT INTERNAL DIMENSION IN SYSTOLE: 2.9 CM (ref 2.1–4)
LEFT VENTRICULAR INTERNAL DIMENSION IN DIASTOLE: 4.4 CM (ref 4.41–6.56)
LEFT VENTRICULAR POSTERIOR WALL IN END DIASTOLE: 0.7 CM
LEFT VENTRICULAR STROKE VOLUME: 54 ML
LV EF: 58 %
MAX DIASTOLIC BP: 76 MMHG
MAX HEART RATE: 106 BPM
MAX PREDICTED HEART RATE: 151 BPM
MAX. SYSTOLIC BP: 140 MMHG
MV E'TISSUE VEL-LAT: 9 CM/S
MV PEAK A VEL: 1.13 M/S
MV PEAK E VEL: 59 CM/S
MV STENOSIS PRESSURE HALF TIME: 0 MS
NUC REST DIASTOLIC VOLUME INDEX: 56 ML/M2
NUC REST SYSTOLIC VOLUME INDEX: 21 ML/M2
NUC STRESS EJECTION FRACTION: 63 %
PROTOCOL NAME: NORMAL
REASON FOR TERMINATION: NORMAL
RIGHT VENTRICLE ID DIMENSION: 2.4 CM
SL CV LV EF: 60
SL CV PED ECHO LEFT VENTRICLE DIASTOLIC VOLUME (MOD BIPLANE) 2D: 87 ML
SL CV PED ECHO LEFT VENTRICLE SYSTOLIC VOLUME (MOD BIPLANE) 2D: 33 ML
SL CV REST NUCLEAR ISOTOPE DOSE: 10.73 MCI
SL CV STRESS NUCLEAR ISOTOPE DOSE: 32.4 MCI
SL CV STRESS RECOVERY BP: NORMAL MMHG
SL CV STRESS RECOVERY HR: 87 BPM
STRESS ANGINA INDEX: 1
STRESS BASELINE BP: NORMAL MMHG
STRESS BASELINE HR: 65 BPM
STRESS O2 SAT REST: 97 %
STRESS PEAK HR: 100 BPM
STRESS POST O2 SAT PEAK: 100 %
STRESS POST PEAK BP: NORMAL MMHG
STRESS ST DEPRESSION: 0 MM
STRESS/REST PERFUSION RATIO: 0.99
TARGET HR FORMULA: NORMAL
TEST INDICATION: NORMAL
TIME IN EXERCISE PHASE: NORMAL
TRICUSPID VALVE S': 0.6 CM/S
Z-SCORE OF LEFT VENTRICULAR DIMENSION IN END SYSTOLE: -2

## 2021-10-14 PROCEDURE — A9502 TC99M TETROFOSMIN: HCPCS

## 2021-10-14 PROCEDURE — 93306 TTE W/DOPPLER COMPLETE: CPT

## 2021-10-14 PROCEDURE — 78452 HT MUSCLE IMAGE SPECT MULT: CPT

## 2021-10-14 PROCEDURE — 93306 TTE W/DOPPLER COMPLETE: CPT | Performed by: INTERNAL MEDICINE

## 2021-10-14 PROCEDURE — 93017 CV STRESS TEST TRACING ONLY: CPT

## 2021-10-14 PROCEDURE — 93016 CV STRESS TEST SUPVJ ONLY: CPT | Performed by: INTERNAL MEDICINE

## 2021-10-14 PROCEDURE — 93018 CV STRESS TEST I&R ONLY: CPT | Performed by: INTERNAL MEDICINE

## 2021-10-14 PROCEDURE — G1004 CDSM NDSC: HCPCS

## 2021-10-14 PROCEDURE — 78452 HT MUSCLE IMAGE SPECT MULT: CPT | Performed by: INTERNAL MEDICINE

## 2021-10-14 RX ORDER — AMINOPHYLLINE DIHYDRATE 25 MG/ML
50 INJECTION, SOLUTION INTRAVENOUS ONCE
Status: COMPLETED | OUTPATIENT
Start: 2021-10-14 | End: 2021-10-14

## 2021-10-14 RX ADMIN — REGADENOSON 0.4 MG: 0.08 INJECTION, SOLUTION INTRAVENOUS at 12:59

## 2021-10-14 RX ADMIN — AMINOPHYLLINE 50 MG: 25 INJECTION, SOLUTION INTRAVENOUS at 13:39

## 2021-11-12 ENCOUNTER — OFFICE VISIT (OUTPATIENT)
Dept: CARDIOLOGY CLINIC | Facility: CLINIC | Age: 71
End: 2021-11-12
Payer: MEDICARE

## 2021-11-12 VITALS
DIASTOLIC BLOOD PRESSURE: 78 MMHG | OXYGEN SATURATION: 98 % | BODY MASS INDEX: 30.22 KG/M2 | RESPIRATION RATE: 18 BRPM | HEIGHT: 64 IN | SYSTOLIC BLOOD PRESSURE: 120 MMHG | WEIGHT: 177 LBS | HEART RATE: 93 BPM

## 2021-11-12 DIAGNOSIS — R07.9 CHEST PAIN, UNSPECIFIED TYPE: Primary | ICD-10-CM

## 2021-11-12 PROCEDURE — 99213 OFFICE O/P EST LOW 20 MIN: CPT | Performed by: INTERNAL MEDICINE

## 2021-11-12 PROCEDURE — 93000 ELECTROCARDIOGRAM COMPLETE: CPT | Performed by: INTERNAL MEDICINE

## 2021-11-12 RX ORDER — LISINOPRIL 10 MG/1
10 TABLET ORAL DAILY
COMMUNITY
Start: 2021-10-18

## 2021-11-23 ENCOUNTER — OFFICE VISIT (OUTPATIENT)
Dept: GASTROENTEROLOGY | Facility: CLINIC | Age: 71
End: 2021-11-23
Payer: MEDICARE

## 2021-11-23 VITALS
WEIGHT: 175 LBS | HEIGHT: 64 IN | HEART RATE: 93 BPM | SYSTOLIC BLOOD PRESSURE: 128 MMHG | DIASTOLIC BLOOD PRESSURE: 90 MMHG | BODY MASS INDEX: 29.88 KG/M2

## 2021-11-23 DIAGNOSIS — R10.84 GENERALIZED ABDOMINAL PAIN: Primary | ICD-10-CM

## 2021-11-23 DIAGNOSIS — K59.00 CONSTIPATION, UNSPECIFIED CONSTIPATION TYPE: ICD-10-CM

## 2021-11-23 DIAGNOSIS — R19.7 DIARRHEA, UNSPECIFIED TYPE: ICD-10-CM

## 2021-11-23 PROCEDURE — 99203 OFFICE O/P NEW LOW 30 MIN: CPT | Performed by: PHYSICIAN ASSISTANT

## 2021-11-23 RX ORDER — DICYCLOMINE HCL 20 MG
20 TABLET ORAL EVERY 6 HOURS PRN
Qty: 60 TABLET | Refills: 3 | Status: SHIPPED | OUTPATIENT
Start: 2021-11-23

## 2021-11-26 ENCOUNTER — TELEPHONE (OUTPATIENT)
Dept: GASTROENTEROLOGY | Facility: CLINIC | Age: 71
End: 2021-11-26

## 2021-12-13 ENCOUNTER — TELEPHONE (OUTPATIENT)
Dept: GASTROENTEROLOGY | Facility: HOSPITAL | Age: 71
End: 2021-12-13

## 2021-12-14 ENCOUNTER — ANESTHESIA EVENT (OUTPATIENT)
Dept: GASTROENTEROLOGY | Facility: HOSPITAL | Age: 71
End: 2021-12-14

## 2021-12-14 ENCOUNTER — HOSPITAL ENCOUNTER (OUTPATIENT)
Dept: GASTROENTEROLOGY | Facility: HOSPITAL | Age: 71
Setting detail: OUTPATIENT SURGERY
Discharge: HOME | End: 2021-12-14
Admitting: INTERNAL MEDICINE
Payer: MEDICARE

## 2021-12-14 ENCOUNTER — ANESTHESIA (OUTPATIENT)
Dept: GASTROENTEROLOGY | Facility: HOSPITAL | Age: 71
End: 2021-12-14

## 2021-12-14 VITALS
WEIGHT: 171.3 LBS | TEMPERATURE: 97.4 F | OXYGEN SATURATION: 97 % | HEART RATE: 78 BPM | DIASTOLIC BLOOD PRESSURE: 67 MMHG | SYSTOLIC BLOOD PRESSURE: 109 MMHG | BODY MASS INDEX: 29.24 KG/M2 | RESPIRATION RATE: 18 BRPM | HEIGHT: 64 IN

## 2021-12-14 DIAGNOSIS — R10.84 GENERALIZED ABDOMINAL PAIN: ICD-10-CM

## 2021-12-14 DIAGNOSIS — K59.00 CONSTIPATION, UNSPECIFIED CONSTIPATION TYPE: ICD-10-CM

## 2021-12-14 DIAGNOSIS — R19.7 DIARRHEA, UNSPECIFIED TYPE: ICD-10-CM

## 2021-12-14 PROCEDURE — 88305 TISSUE EXAM BY PATHOLOGIST: CPT | Performed by: PATHOLOGY

## 2021-12-14 PROCEDURE — 45385 COLONOSCOPY W/LESION REMOVAL: CPT | Performed by: INTERNAL MEDICINE

## 2021-12-14 RX ORDER — PROPOFOL 10 MG/ML
INJECTION, EMULSION INTRAVENOUS AS NEEDED
Status: DISCONTINUED | OUTPATIENT
Start: 2021-12-14 | End: 2021-12-14

## 2021-12-14 RX ORDER — LIDOCAINE HYDROCHLORIDE 20 MG/ML
INJECTION, SOLUTION EPIDURAL; INFILTRATION; INTRACAUDAL; PERINEURAL AS NEEDED
Status: DISCONTINUED | OUTPATIENT
Start: 2021-12-14 | End: 2021-12-14

## 2021-12-14 RX ORDER — SODIUM CHLORIDE, SODIUM LACTATE, POTASSIUM CHLORIDE, CALCIUM CHLORIDE 600; 310; 30; 20 MG/100ML; MG/100ML; MG/100ML; MG/100ML
100 INJECTION, SOLUTION INTRAVENOUS CONTINUOUS
Status: DISCONTINUED | OUTPATIENT
Start: 2021-12-14 | End: 2021-12-18 | Stop reason: HOSPADM

## 2021-12-14 RX ORDER — ALBUTEROL SULFATE 90 UG/1
1 AEROSOL, METERED RESPIRATORY (INHALATION) EVERY 6 HOURS PRN
COMMUNITY
Start: 2021-11-29

## 2021-12-14 RX ADMIN — PROPOFOL 150 MG: 10 INJECTION, EMULSION INTRAVENOUS at 07:48

## 2021-12-14 RX ADMIN — PROPOFOL 50 MG: 10 INJECTION, EMULSION INTRAVENOUS at 08:02

## 2021-12-14 RX ADMIN — PROPOFOL 50 MG: 10 INJECTION, EMULSION INTRAVENOUS at 07:59

## 2021-12-14 RX ADMIN — PROPOFOL 50 MG: 10 INJECTION, EMULSION INTRAVENOUS at 07:52

## 2021-12-14 RX ADMIN — PROPOFOL 50 MG: 10 INJECTION, EMULSION INTRAVENOUS at 07:56

## 2021-12-14 RX ADMIN — LIDOCAINE HYDROCHLORIDE 100 MG: 20 INJECTION, SOLUTION EPIDURAL; INFILTRATION; INTRACAUDAL; PERINEURAL at 07:48

## 2021-12-14 RX ADMIN — SODIUM CHLORIDE, SODIUM LACTATE, POTASSIUM CHLORIDE, AND CALCIUM CHLORIDE 100 ML/HR: .6; .31; .03; .02 INJECTION, SOLUTION INTRAVENOUS at 07:21

## 2021-12-22 ENCOUNTER — TELEPHONE (OUTPATIENT)
Dept: GASTROENTEROLOGY | Facility: CLINIC | Age: 71
End: 2021-12-22

## 2022-05-13 ENCOUNTER — HOSPITAL ENCOUNTER (EMERGENCY)
Facility: HOSPITAL | Age: 72
Discharge: HOME | End: 2022-05-13
Attending: EMERGENCY MEDICINE
Payer: MEDICARE

## 2022-05-13 ENCOUNTER — APPOINTMENT (EMERGENCY)
Dept: RADIOLOGY | Facility: HOSPITAL | Age: 72
End: 2022-05-13
Payer: MEDICARE

## 2022-05-13 VITALS
RESPIRATION RATE: 18 BRPM | SYSTOLIC BLOOD PRESSURE: 132 MMHG | OXYGEN SATURATION: 97 % | HEART RATE: 85 BPM | DIASTOLIC BLOOD PRESSURE: 86 MMHG

## 2022-05-13 DIAGNOSIS — M25.559 HIP PAIN: Primary | ICD-10-CM

## 2022-05-13 PROCEDURE — 1124F ACP DISCUSS-NO DSCNMKR DOCD: CPT | Performed by: EMERGENCY MEDICINE

## 2022-05-13 PROCEDURE — 99283 EMERGENCY DEPT VISIT LOW MDM: CPT

## 2022-05-13 PROCEDURE — 99283 EMERGENCY DEPT VISIT LOW MDM: CPT | Performed by: EMERGENCY MEDICINE

## 2022-05-13 PROCEDURE — 73502 X-RAY EXAM HIP UNI 2-3 VIEWS: CPT

## 2022-05-13 NOTE — ED NOTES
Patient reports "taking 400 mg of Motrin PO 2 hours prior to arriving to ED"     Ruthann Izaguirre RN  05/13/22 2990

## 2022-05-13 NOTE — ED PROVIDER NOTES
History  Chief Complaint   Patient presents with    Hip Pain     Patient reports "hx of hip problems, fell on Sunday at home landing on buttocks, c/o 8/10 left hip pain"     78-year-old female, presents with left hip pain  Reports chronic pain in left hip from arthritis, is supposed to have hip replacement later this year  Patient states she fell and landed on left buttocks several days ago, has been having pain in left posterior hip and buttock since  Pain constant, worse with movement of leg or walking  Denies any other injury, no associated weakness or numbness  History provided by:  Patient   used: No    Hip Pain      Prior to Admission Medications   Prescriptions Last Dose Informant Patient Reported? Taking? Cholecalciferol (VITAMIN D3) 5000 units CAPS  Self Yes No   Sig: Take 1 capsule by mouth daily   acetaminophen (TYLENOL) 500 mg tablet  Self Yes No   Sig: Acetaminophen TABS    Refills: 0       Active   albuterol (PROVENTIL HFA,VENTOLIN HFA) 90 mcg/act inhaler   Yes No   Sig: Inhale 1 puff every 6 (six) hours as needed   dicyclomine (BENTYL) 20 mg tablet   No No   Sig: Take 1 tablet (20 mg total) by mouth every 6 (six) hours as needed (abdominal pain)   gabapentin (NEURONTIN) 100 mg capsule  Self Yes No   Sig: Take 100 mg by mouth   lisinopril (ZESTRIL) 10 mg tablet  Self Yes No   Sig: Take 10 mg by mouth daily      Facility-Administered Medications: None       Past Medical History:   Diagnosis Date    COPD (chronic obstructive pulmonary disease) (HCC)     Hypertension        Past Surgical History:   Procedure Laterality Date    CARPAL TUNNEL RELEASE      CHOLECYSTECTOMY      HYSTERECTOMY      KNEE ARTHROSCOPY      left elbow       Family History   Problem Relation Age of Onset    Hypertension Mother     Heart disease Mother      I have reviewed and agree with the history as documented      E-Cigarette/Vaping    E-Cigarette Use Never User      E-Cigarette/Vaping Substances    Nicotine No     THC No     CBD No     Flavoring No     Other No     Unknown No      Social History     Tobacco Use    Smoking status: Former Smoker    Smokeless tobacco: Never Used   Vaping Use    Vaping Use: Never used   Substance Use Topics    Alcohol use: Not Currently    Drug use: Not Currently       Review of Systems   Constitutional: Negative  Respiratory: Negative  Cardiovascular: Negative  Gastrointestinal: Negative  Skin: Negative  Neurological: Negative  Physical Exam  Physical Exam  Vitals and nursing note reviewed  Constitutional:       General: She is not in acute distress  HENT:      Head: Normocephalic and atraumatic  Musculoskeletal:         General: Normal range of motion  Comments: Left hip with no swelling no deformity, full range of motion  Tenderness to left lower buttocks  No knee tenderness  Skin:     General: Skin is warm and dry  Neurological:      General: No focal deficit present  Mental Status: She is alert and oriented to person, place, and time  Sensory: No sensory deficit  Motor: No weakness  Vital Signs  ED Triage Vitals [05/13/22 1149]   Temp Pulse Respirations Blood Pressure SpO2   -- 85 18 132/86 97 %      Temp src Heart Rate Source Patient Position - Orthostatic VS BP Location FiO2 (%)   -- Monitor -- Right arm --      Pain Score       --           Vitals:    05/13/22 1149   BP: 132/86   Pulse: 85         Visual Acuity      ED Medications  Medications - No data to display    Diagnostic Studies  Results Reviewed     None                 XR hip/pelv 2-3 vws left   Final Result by Ronnell Fregoso MD (05/13 1245)      No acute osseous abnormality  Severe osteoarthrosis of the left hip  Workstation performed: [id number]                    Procedures  Procedures         ED Course  ED Course as of 05/13/22 1345   Fri May 13, 2022   1246 X-ray reviewed, results discussed with patient    Patient is able to ambulate and bear weight, instructed to follow-up with orthopedic doctor  SBIRT 22yo+    Flowsheet Row Most Recent Value   SBIRT (25 yo +)    In order to provide better care to our patients, we are screening all of our patients for alcohol and drug use  Would it be okay to ask you these screening questions? No Filed at: 05/13/2022 1152                    MDM  Number of Diagnoses or Management Options  Diagnosis management comments: 69-year-old female, presenting with left hip pain differential diagnosis includes fracture, contusion, sprain mono diagnosis  Patient is able to bear weight and ambulate, x-ray ordered  Amount and/or Complexity of Data Reviewed  Tests in the radiology section of CPT®: ordered        Disposition  Final diagnoses:   Hip pain     Time reflects when diagnosis was documented in both MDM as applicable and the Disposition within this note     Time User Action Codes Description Comment    5/13/2022 12:48 PM Jose Zavala [M25 [id number]] Hip pain       ED Disposition     ED Disposition   Discharge    Condition   Stable    Date/Time   Fri May 13, 2022 12:48 PM    Comment   Andrade Willson discharge to home/self care                 Follow-up Information    None         Discharge Medication List as of 5/13/2022 12:49 PM      CONTINUE these medications which have NOT CHANGED    Details   acetaminophen (TYLENOL) 500 mg tablet Acetaminophen TABS    Refills: 0       Active, Historical Med      albuterol (PROVENTIL HFA,VENTOLIN HFA) 90 mcg/act inhaler Inhale 1 puff every 6 (six) hours as needed, Starting Mon 11/29/2021, Historical Med      Cholecalciferol (VITAMIN D3) 5000 units CAPS Take 1 capsule by mouth daily, Starting Mon 8/3/2015, Historical Med      dicyclomine (BENTYL) 20 mg tablet Take 1 tablet (20 mg total) by mouth every 6 (six) hours as needed (abdominal pain), Starting Tue 11/23/2021, Normal      gabapentin (NEURONTIN) 100 mg capsule Take 100 mg by mouth, Starting Thu 6/24/2021, Until Fri 6/24/2022 at 2359, Historical Med      lisinopril (ZESTRIL) 10 mg tablet Take 10 mg by mouth daily, Starting Mon 10/18/2021, Historical Med             No discharge procedures on file      PDMP Review     None          ED Provider  Electronically Signed by           Jacy Ventura MD  05/13/22 9886

## 2022-11-21 ENCOUNTER — TELEPHONE (OUTPATIENT)
Dept: GASTROENTEROLOGY | Facility: CLINIC | Age: 72
End: 2022-11-21

## 2022-11-21 NOTE — TELEPHONE ENCOUNTER
Patients GI provider:  JORGE Mcfarlane    Number to return call: [phone]    Reason for call: Pt calling stating she hasn't been able to eat anything in 2 months and now when she eats, she feels like there is a blockage in her throat      Scheduled procedure/appointment date if applicable: N/A

## 2022-11-22 NOTE — TELEPHONE ENCOUNTER
Spoke with patient  History of generalized abdominal pain, diarrhea, constipation    Patient c/o epigastric pain after a meal  She states "I feel blocked up, and if I breathe real heavy it goes away"  Patient is eating well  Denies dysphagia, nausea, vomiting, fever, chills, SOB, weakness, black or bloody stools  OV is scheduled for follow-up  Please place patient on a wait list  Thank you

## 2023-01-05 RX ORDER — CLINDAMYCIN HYDROCHLORIDE 300 MG/1
CAPSULE ORAL
COMMUNITY
Start: 2022-12-02 | End: 2023-01-10

## 2023-01-05 RX ORDER — CYANOCOBALAMIN 1000 UG/ML
1000 INJECTION, SOLUTION INTRAMUSCULAR; SUBCUTANEOUS
COMMUNITY
Start: 2022-07-13 | End: 2023-01-10

## 2023-01-05 RX ORDER — AMLODIPINE BESYLATE AND BENAZEPRIL HYDROCHLORIDE 5; 10 MG/1; MG/1
1 CAPSULE ORAL DAILY
COMMUNITY
Start: 2022-11-09

## 2023-01-05 RX ORDER — TRAMADOL HYDROCHLORIDE 50 MG/1
TABLET ORAL
COMMUNITY
Start: 2022-11-09

## 2023-01-05 RX ORDER — NIRMATRELVIR AND RITONAVIR 300-100 MG
KIT ORAL
COMMUNITY
Start: 2022-10-04 | End: 2023-01-10

## 2023-01-05 RX ORDER — MELOXICAM 15 MG/1
15 TABLET ORAL DAILY
COMMUNITY
Start: 2022-12-07 | End: 2023-01-10

## 2023-01-05 RX ORDER — METHYLPREDNISOLONE 4 MG/1
TABLET ORAL
COMMUNITY
Start: 2022-11-14 | End: 2023-01-10

## 2023-01-05 RX ORDER — CELECOXIB 200 MG/1
200 CAPSULE ORAL DAILY
COMMUNITY
Start: 2022-11-01 | End: 2023-01-10

## 2023-01-05 RX ORDER — OMEPRAZOLE 40 MG/1
40 CAPSULE, DELAYED RELEASE ORAL DAILY
COMMUNITY
Start: 2022-12-07 | End: 2023-01-10 | Stop reason: SDUPTHER

## 2023-01-05 RX ORDER — PREDNISONE 20 MG/1
TABLET ORAL
COMMUNITY
Start: 2022-01-11 | End: 2023-01-10

## 2023-01-05 RX ORDER — IBUPROFEN 200 MG
600 TABLET ORAL EVERY 6 HOURS PRN
COMMUNITY
End: 2023-01-10

## 2023-01-10 ENCOUNTER — OFFICE VISIT (OUTPATIENT)
Dept: GASTROENTEROLOGY | Facility: CLINIC | Age: 73
End: 2023-01-10

## 2023-01-10 ENCOUNTER — TELEPHONE (OUTPATIENT)
Dept: GASTROENTEROLOGY | Facility: CLINIC | Age: 73
End: 2023-01-10

## 2023-01-10 VITALS
HEIGHT: 64 IN | SYSTOLIC BLOOD PRESSURE: 140 MMHG | HEART RATE: 85 BPM | OXYGEN SATURATION: 98 % | BODY MASS INDEX: 29.02 KG/M2 | WEIGHT: 170 LBS | DIASTOLIC BLOOD PRESSURE: 84 MMHG

## 2023-01-10 DIAGNOSIS — K21.9 GASTROESOPHAGEAL REFLUX DISEASE, UNSPECIFIED WHETHER ESOPHAGITIS PRESENT: Primary | ICD-10-CM

## 2023-01-10 DIAGNOSIS — R10.13 EPIGASTRIC PAIN: ICD-10-CM

## 2023-01-10 DIAGNOSIS — K58.1 IRRITABLE BOWEL SYNDROME WITH CONSTIPATION: ICD-10-CM

## 2023-01-10 DIAGNOSIS — R10.84 GENERALIZED ABDOMINAL PAIN: ICD-10-CM

## 2023-01-10 RX ORDER — OMEPRAZOLE 40 MG/1
40 CAPSULE, DELAYED RELEASE ORAL 2 TIMES DAILY
Qty: 60 CAPSULE | Refills: 11 | Status: SHIPPED | OUTPATIENT
Start: 2023-01-10 | End: 2024-01-10

## 2023-01-10 RX ORDER — DICYCLOMINE HCL 20 MG
20 TABLET ORAL EVERY 6 HOURS PRN
Qty: 60 TABLET | Refills: 3 | Status: SHIPPED | OUTPATIENT
Start: 2023-01-10

## 2023-01-10 RX ORDER — LUBIPROSTONE 8 UG/1
8 CAPSULE ORAL 2 TIMES DAILY WITH MEALS
Qty: 60 CAPSULE | Refills: 11 | Status: SHIPPED | OUTPATIENT
Start: 2023-01-10

## 2023-01-10 RX ORDER — LISINOPRIL 5 MG/1
5 TABLET ORAL EVERY EVENING
COMMUNITY
Start: 2022-12-07 | End: 2023-01-10

## 2023-01-10 NOTE — PROGRESS NOTES
[facility] - Outpatient Follow-up Note  Alonso Blakely 71 y o  female MRN: [id number]  Encounter: [id number]          ASSESSMENT AND PLAN:      1  Irritable bowel syndrome with constipation  She notes several days of no bowel movement followed by a hard stool and then several loose stools  She needs to be on fiber and probiotic  She does not like Miralax  Linzess will cause diarrhea  Will trial low dose Amitiza 8mcg BID with food  Colonoscopy last year showed polyps    2  Gastroesophageal reflux disease, unspecified whether esophagitis present  3  Epigastric pain  She notes epigastric pain and a bitter taste in her mouth  Will Increase Omeprazole to BID (started once daily by ENT with some benefit)  Will plan EGD    ______________________________________________________________________    SUBJECTIVE: 78-year-old female with history of constipation predominant irritable bowel syndrome who presents for evaluation of ongoing constipation as well as epigastric pain and a bad taste in her mouth  She is struggling with constipation for years  At her last appointment I advised fiber and probiotic as well as dicyclomine  She was scheduled for colonoscopy which she had done in December of last year  This showed polyps that were removed  These were tubular adenomas  She is no longer utilizing fiber or probiotic  She reports that she will not have a bowel movement for several days and then have a hard stool followed by several loose to watery stools  She has abdominal discomfort and bloating associated with this  She reports that over the past few months she has been having epigastric pain, redness of her tongue and her mouth  She also has heartburn  She saw ENT recently who prescribed omeprazole 40 mg once daily  She admits that this has helped slightly but has not resolved the problem  REVIEW OF SYSTEMS IS OTHERWISE NEGATIVE        Historical Information   Past Medical History: Diagnosis Date   • COPD (chronic obstructive pulmonary disease) ([facility] [address] )    • Hypertension      Past Surgical History:   Procedure Laterality Date   • CARPAL TUNNEL RELEASE     • CHOLECYSTECTOMY     • HYSTERECTOMY     • KNEE ARTHROSCOPY      left elbow   • REVISION TOTAL HIP ARTHROPLASTY       Social History   Social History     Substance and Sexual Activity   Alcohol Use Not Currently     Social History     Substance and Sexual Activity   Drug Use Not Currently     Social History     Tobacco Use   Smoking Status Former   Smokeless Tobacco Never     Family History   Problem Relation Age of Onset   • Hypertension Mother    • Heart disease Mother        Meds/Allergies       Current Outpatient Medications:   •  albuterol (PROVENTIL HFA,VENTOLIN HFA) 90 mcg/act inhaler  •  amLODIPine-benazepril (LOTREL 5-10) 5-10 MG per capsule  •  Cholecalciferol (VITAMIN D3) 5000 units CAPS  •  dicyclomine (BENTYL) 20 mg tablet  •  gabapentin (NEURONTIN) 100 mg capsule  •  lisinopril (ZESTRIL) 10 mg tablet  •  lubiprostone (AMITIZA) 8 mcg capsule  •  omeprazole (PriLOSEC) 40 MG capsule  •  traMADol (ULTRAM) 50 mg tablet    Allergies   Allergen Reactions   • Acetazolamide Other (See Comments)   • Amoxicillin Hives   • Sulfa Antibiotics Hives           Objective     Blood pressure 140/84, pulse 85, height 5' 4" (1 626 m), weight 77 1 kg (170 lb), SpO2 98 %  Body mass index is 29 18 kg/m²  PHYSICAL EXAM:      General Appearance:   Alert, cooperative, no distress   HEENT:   Normocephalic, atraumatic, anicteric      Neck:  Supple, symmetrical, trachea midline   Lungs:   Clear to auscultation bilaterally; no rales, rhonchi or wheezing; respirations unlabored    Heart[de-identified]   Regular rate and rhythm; no murmur, rub, or gallop     Abdomen:   Soft, non-tender, non-distended; normal bowel sounds; no masses, no organomegaly    Genitalia:   Deferred    Rectal:   Deferred    Extremities:  No cyanosis, clubbing or edema    Pulses:  2+ and symmetric  Skin:  No jaundice, rashes, or lesions    Lymph nodes:  No palpable cervical lymphadenopathy        Lab Results:   No visits with results within 1 Day(s) from this visit  Latest known visit with results is:   Hospital Outpatient Visit on 12/14/2021   Component Date Value   • Case Report 12/14/2021                      Value:Surgical Pathology Report                         Case: [id number]                                   Authorizing Provider:  Rosi Velazco DO          Collected:           12/14/2021 0801              Ordering Location:      [facility]       Received:            12/14/2021 [place] [id number] Endoscopy                                                             Pathologist:           Lila Bonilla DO                                                     Specimen:    Polyp, Colorectal, ascending                                                              • Final Diagnosis 12/14/2021                      Value: This result contains rich text formatting which cannot be displayed here  • Additional Information 12/14/2021                      Value: This result contains rich text formatting which cannot be displayed here  • Synoptic Checklist 12/14/2021                      Value:                            COLON/RECTUM POLYP FORM - GI - All Specimens                                                                                     :    Adenoma(s)     • Gross Description 12/14/2021                      Value: This result contains rich text formatting which cannot be displayed here  • Clinical Information 12/14/2021                      Value:Cold snare polypectomy x2         Radiology Results:   No results found

## 2023-01-10 NOTE — PATIENT INSTRUCTIONS
Scheduled date of EGD(as of today):4/17/23  Physician performing EGD:Daniel  Location of EGD:[place]  Instructions reviewed with patient by:Sukhjinder schaeffer  Clearances:  none

## 2023-03-15 ENCOUNTER — TELEPHONE (OUTPATIENT)
Dept: GASTROENTEROLOGY | Facility: CLINIC | Age: 73
End: 2023-03-15

## 2023-03-15 NOTE — TELEPHONE ENCOUNTER
Left message for patient to call and schedule appointment to see [facility] 2 weeks after procedure

## 2023-04-17 PROBLEM — I10 HYPERTENSION: Status: ACTIVE | Noted: 2017-11-02

## 2023-04-17 PROBLEM — J44.9 COPD (CHRONIC OBSTRUCTIVE PULMONARY DISEASE) (HCC): Status: ACTIVE | Noted: 2017-11-01

## 2023-04-17 RX ORDER — SODIUM CHLORIDE, SODIUM LACTATE, POTASSIUM CHLORIDE, CALCIUM CHLORIDE 600; 310; 30; 20 MG/100ML; MG/100ML; MG/100ML; MG/100ML
50 INJECTION, SOLUTION INTRAVENOUS CONTINUOUS
Status: CANCELLED | OUTPATIENT
Start: 2023-04-17

## 2023-04-17 RX ORDER — LIDOCAINE HYDROCHLORIDE 10 MG/ML
0.5 INJECTION, SOLUTION EPIDURAL; INFILTRATION; INTRACAUDAL; PERINEURAL ONCE AS NEEDED
Status: CANCELLED | OUTPATIENT
Start: 2023-04-17

## 2023-04-24 ENCOUNTER — TELEPHONE (OUTPATIENT)
Dept: GASTROENTEROLOGY | Facility: CLINIC | Age: 73
End: 2023-04-24

## 2024-05-22 ENCOUNTER — APPOINTMENT (OUTPATIENT)
Dept: LAB | Facility: HOSPITAL | Age: 74
End: 2024-05-22
Attending: INTERNAL MEDICINE
Payer: COMMERCIAL

## 2024-05-22 ENCOUNTER — OFFICE VISIT (OUTPATIENT)
Dept: GASTROENTEROLOGY | Facility: CLINIC | Age: 74
End: 2024-05-22
Payer: COMMERCIAL

## 2024-05-22 ENCOUNTER — TELEPHONE (OUTPATIENT)
Dept: GASTROENTEROLOGY | Facility: CLINIC | Age: 74
End: 2024-05-22

## 2024-05-22 VITALS
SYSTOLIC BLOOD PRESSURE: 109 MMHG | WEIGHT: 166.8 LBS | DIASTOLIC BLOOD PRESSURE: 75 MMHG | HEIGHT: 64 IN | OXYGEN SATURATION: 99 % | HEART RATE: 82 BPM | TEMPERATURE: 97.5 F | BODY MASS INDEX: 28.48 KG/M2

## 2024-05-22 DIAGNOSIS — K21.9 GASTROESOPHAGEAL REFLUX DISEASE WITHOUT ESOPHAGITIS: Primary | ICD-10-CM

## 2024-05-22 DIAGNOSIS — K58.0 IRRITABLE BOWEL SYNDROME WITH DIARRHEA: ICD-10-CM

## 2024-05-22 PROCEDURE — 87493 C DIFF AMPLIFIED PROBE: CPT

## 2024-05-22 PROCEDURE — 99214 OFFICE O/P EST MOD 30 MIN: CPT | Performed by: INTERNAL MEDICINE

## 2024-05-22 RX ORDER — OMEPRAZOLE 40 MG/1
40 CAPSULE, DELAYED RELEASE ORAL DAILY
Qty: 31 CAPSULE | Refills: 6 | Status: SHIPPED | OUTPATIENT
Start: 2024-05-22

## 2024-05-22 RX ORDER — BENZONATATE 100 MG/1
100 CAPSULE ORAL 3 TIMES DAILY PRN
COMMUNITY
Start: 2024-02-22

## 2024-05-22 RX ORDER — ALOSETRON HYDROCHLORIDE 0.5 MG/1
0.5 TABLET ORAL 2 TIMES DAILY
Qty: 62 TABLET | Refills: 3 | Status: SHIPPED | OUTPATIENT
Start: 2024-05-22

## 2024-05-22 RX ORDER — CELECOXIB 200 MG/1
200 CAPSULE ORAL DAILY
COMMUNITY
Start: 2024-04-09 | End: 2025-04-09

## 2024-05-22 NOTE — PROGRESS NOTES
[facility] - Outpatient Follow-up Note  Suzan Fernandez 73 y.o. female MRN: [id number]  Encounter: [id number]          ASSESSMENT AND PLAN:      1. Gastroesophageal reflux disease without esophagitis  - omeprazole (PriLOSEC) 40 MG capsule; Take 1 capsule (40 mg total) by mouth daily  Dispense: 31 capsule; Refill: 6    2. Irritable bowel syndrome with diarrhea  - Clostridium difficile toxin by PCR with EIA; Future  -Continue with probiotic and fiber supplementation daily  -Begin Lotronex 0.5 mg twice daily; the dosage can I will be increased to 1 mg twice daily or decrease to 2.5 mg daily depending upon her response to the therapy  -High-fiber diet to continue to include fruits, vegetables, and whole grain foods, daily    3.  History of colon polyps, adenomatous  -Repeat colonoscopy in 2026  ______________________________________________________________________    SUBJECTIVE: Suzan returns the office today stating that her diarrhea now is out of control.  Is happening 2-3 times daily.  She used to have problems with constipation in the past but now diarrhea is the current abnormality associated with irritable bowel syndrome.  There is also associated bloating as well as lower abdominal pain.  She had CT scan of the abdomen and pelvis that was performed at [facility] on 5/15/2024.  I reviewed these results completely.  She had evidence of diverticulosis but no evidence of diverticulitis.  There were no other gastrointestinal abnormalities that were significant.  She has had a previous cholecystectomy and has biliary ductal dilation as a consequence of this.  She has a prior history of C. difficile infection.  She tried a probiotic along with fiber supplementation and this did not seem to help.  There is been no rectal bleeding or hematemesis.      REVIEW OF SYSTEMS IS OTHERWISE NEGATIVE.      Historical Information   Past Medical History:   Diagnosis Date    COPD (chronic  "obstructive pulmonary disease) (HCC)     GERD (gastroesophageal reflux disease)     Hypertension      Past Surgical History:   Procedure Laterality Date    CARPAL TUNNEL RELEASE      CHOLECYSTECTOMY      HYSTERECTOMY      KNEE ARTHROSCOPY      left elbow    REVISION TOTAL HIP ARTHROPLASTY Left      Social History   Social History     Substance and Sexual Activity   Alcohol Use Not Currently     Social History     Substance and Sexual Activity   Drug Use Not Currently     Social History     Tobacco Use   Smoking Status Former   Smokeless Tobacco Never     Family History   Problem Relation Age of Onset    Hypertension Mother     Heart disease Mother        Meds/Allergies       Current Outpatient Medications:     albuterol (PROVENTIL HFA,VENTOLIN HFA) 90 mcg/act inhaler    benzonatate (TESSALON PERLES) 100 mg capsule    celecoxib (CeleBREX) 200 mg capsule    Cholecalciferol (VITAMIN D3) 5000 units CAPS    Cyanocobalamin 1000 MCG/ML KIT    gabapentin (NEURONTIN) 100 mg capsule    lisinopril (ZESTRIL) 10 mg tablet    omeprazole (PriLOSEC) 40 MG capsule    omeprazole (PriLOSEC) 40 MG capsule    traMADol (ULTRAM) 50 mg tablet    amLODIPine-benazepril (LOTREL 5-10) 5-10 MG per capsule    dicyclomine (BENTYL) 20 mg tablet    lubiprostone (AMITIZA) 8 mcg capsule    Allergies   Allergen Reactions    Acetazolamide Other (See Comments)    Amoxicillin Hives    Sulfa Antibiotics Hives           Objective     Blood pressure 109/75, pulse 82, temperature 97.5 °F (36.4 °C), temperature source Temporal, height 5' 4\" (1.626 m), weight 75.7 kg (166 lb 12.8 oz), SpO2 99%. Body mass index is 28.63 kg/m².      PHYSICAL EXAM:      General Appearance:   Alert, cooperative, no distress   HEENT:   Normocephalic, atraumatic, anicteric.     Neck:  Supple, symmetrical, trachea midline   Lungs:   Clear to auscultation bilaterally; no rales, rhonchi or wheezing; respirations unlabored    Heart::   Regular rate and rhythm; no murmur, rub, or gallop. "   Abdomen:   Soft, non-tender, non-distended; normal bowel sounds; no masses, no organomegaly    Genitalia:   Deferred    Rectal:   Deferred    Extremities:  No cyanosis, clubbing or edema    Pulses:  2+ and symmetric    Skin:  No jaundice, rashes, or lesions    Lymph nodes:  No palpable cervical lymphadenopathy        Lab Results:   No visits with results within 1 Day(s) from this visit.   Latest known visit with results is:   Hospital Outpatient Visit on 04/17/2023   Component Date Value    Case Report 04/17/2023                      Value:Surgical Pathology Report                         Case: [id number]                                   Authorizing Provider:  Jorge Sanchez DO          Collected:           04/17/2023 0746              Ordering Location:      [facility]       Received:            04/17/2023 1527                                     [facility]                                                             Pathologist:           Rosanne Torres MD                                                                  Specimen:    Stomach, antrum                                                                            Final Diagnosis 04/17/2023                      Value:This result contains rich text formatting which cannot be displayed here.    Additional Information 04/17/2023                      Value:This result contains rich text formatting which cannot be displayed here.    Gross Description 04/17/2023                      Value:This result contains rich text formatting which cannot be displayed here.    Clinical Information 04/17/2023                      Value:Cold bx r/o H. Pylori         Radiology Results:   NO CHARGE CT LUMBAR SPINE    Result Date: 5/15/2024  Narrative: CT ABDOMEN AND PELVIS CT LUMBAR SPINE HISTORY: midline low back pain; increased flatulence, urine/stool incontinence x 2 weeks TECHNIQUE: Axial sections were obtained from the diaphragm to the pubic symphysis according to  departmental protocol.  100 cc of Omnipaque 350 were administered intravenously. Axial sections were obtained through the lumbar spine. Coronal and sagittal reformations were performed.  The imaging data was reconstructed from the intravenous contrast enhanced CT abdomen/pelvis dataset. Priors for comparison: 06/06/2022, CT abdomen and pelvis. FINDINGS: CT ABDOMEN AND PELVIS The liver and spleen are normal for size. There are numerous subcentimeter hepatic cysts. There are numerous subcentimeter hypoenhancing splenic lesions. Patient is status post cholecystectomy. There is extrahepatic biliary ductal dilatation, which can be a normal finding in the postcholecystectomy state. Adrenal glands appear within normal limits. No acute peripancreatic fat stranding is identified. There is no abdominal aortic aneurysm. Calcific arteriosclerotic disease is noted. Patient is status post hysterectomy. There is suboptimal visualization of the deep pelvis including the region of the urinary bladder secondary to severe metallic streak artifact related to LEFT hip prosthesis. No distention of the urinary bladder is identified. There is question of fat stranding adjacent to the urinary bladder, indeterminate particularly given the study limitations but recommend correlation with urinalysis. There is no hydronephrosis. The distal LEFT ureter could not be visualized. There is colonic diverticulosis. No evidence of acute diverticulitis. There is mild colonic fecal retention. No evidence mechanical bowel obstruction. Normal appendix is identified. CT LUMBAR SPINE For the purposes of this dictation, the lumbar vertebrae are labeled from a caudal to cranial direction, the inferior most vertebra with lumbar morphology being labeled as L5. Multilevel spondylotic and facet degenerative changes are noted. There is no visible acute fracture involving the lumbar spine. There is gross preservation of lumbar spine alignment.  There is no lumbar  vertebral compression.   This examination consists of a reconstructed data set which is optimized for evaluation of osseous structures only. This examination does not adequately assess ligamentous injury or laxity. There is poor evaluation of the contents of the spinal canal.    Impression: IMPRESSION: 1.  There is suboptimal visualization of the deep pelvis including the region of the urinary bladder secondary to severe metallic streak artifact related to LEFT hip prosthesis. No distention of the urinary bladder is identified. There is question of fat stranding adjacent to the urinary bladder, indeterminate particularly given the study limitations but recommend correlation with urinalysis. 2.  Colonic diverticulosis without evidence of acute diverticulitis. 3.  Mild colonic fecal retention. 4.  No evidence of acute osseous injury to the lumbar spine. Continue to follow the spine trauma imaging protocol. CT examination performed with dose lowering protocol in accordance with [employer] Workstation:[id number]    CTA abdomen pelvis w wo contrast    Result Date: 5/15/2024  Narrative: CT ABDOMEN AND PELVIS CT LUMBAR SPINE HISTORY: midline low back pain; increased flatulence, urine/stool incontinence x 2 weeks TECHNIQUE: Axial sections were obtained from the diaphragm to the pubic symphysis according to departmental protocol.  100 cc of Omnipaque 350 were administered intravenously. Axial sections were obtained through the lumbar spine. Coronal and sagittal reformations were performed.  The imaging data was reconstructed from the intravenous contrast enhanced CT abdomen/pelvis dataset. Priors for comparison: 06/06/2022, CT abdomen and pelvis. FINDINGS: CT ABDOMEN AND PELVIS The liver and spleen are normal for size. There are numerous subcentimeter hepatic cysts. There are numerous subcentimeter hypoenhancing splenic lesions. Patient is status post cholecystectomy. There is extrahepatic biliary ductal dilatation, which can be a  normal finding in the postcholecystectomy state. Adrenal glands appear within normal limits. No acute peripancreatic fat stranding is identified. There is no abdominal aortic aneurysm. Calcific arteriosclerotic disease is noted. Patient is status post hysterectomy. There is suboptimal visualization of the deep pelvis including the region of the urinary bladder secondary to severe metallic streak artifact related to LEFT hip prosthesis. No distention of the urinary bladder is identified. There is question of fat stranding adjacent to the urinary bladder, indeterminate particularly given the study limitations but recommend correlation with urinalysis. There is no hydronephrosis. The distal LEFT ureter could not be visualized. There is colonic diverticulosis. No evidence of acute diverticulitis. There is mild colonic fecal retention. No evidence mechanical bowel obstruction. Normal appendix is identified. CT LUMBAR SPINE For the purposes of this dictation, the lumbar vertebrae are labeled from a caudal to cranial direction, the inferior most vertebra with lumbar morphology being labeled as L5. Multilevel spondylotic and facet degenerative changes are noted. There is no visible acute fracture involving the lumbar spine. There is gross preservation of lumbar spine alignment.  There is no lumbar vertebral compression.   This examination consists of a reconstructed data set which is optimized for evaluation of osseous structures only. This examination does not adequately assess ligamentous injury or laxity. There is poor evaluation of the contents of the spinal canal.    Impression: IMPRESSION: 1.  There is suboptimal visualization of the deep pelvis including the region of the urinary bladder secondary to severe metallic streak artifact related to LEFT hip prosthesis. No distention of the urinary bladder is identified. There is question of fat stranding adjacent to the urinary bladder, indeterminate particularly given the  study limitations but recommend correlation with urinalysis. 2.  Colonic diverticulosis without evidence of acute diverticulitis. 3.  Mild colonic fecal retention. 4.  No evidence of acute osseous injury to the lumbar spine. Continue to follow the spine trauma imaging protocol. CT examination performed with dose lowering protocol in accordance with [employer]. Workstation:[id number]

## 2024-05-22 NOTE — TELEPHONE ENCOUNTER
Medication prior auth is needed for Alosetron HCI 0.5 mg Sig: Take 1 tablet (0.5 mg total) by mouth 2 (two) times a day   CMM Key:  [id number]    Pls initiate PA. Thanks.  
PA for alosetron    Submitted via    []CMM-KEY   [x]Kirsten-Case ID # Case ID:[id number]:[phone]   []Faxed to plan   []Other website   []Phone call Case ID #     Office notes sent, clinical questions answered. Awaiting determination    Turnaround time for your insurance to make a decision on your Prior Authorization can take 7-21 business days.           
PA for alosetron approved Approved     Date(s) approved until 12/31/2024    Case #    Patient advised by          [] MyChart Message  [] Phone call   [x]LMOM advising med approval   []L/M to call office as no active Communication consent on file  []Unable to leave detailed message as VM not approved on Communication consent       Pharmacy advised by    [x]Fax  []Phone call    Approval letter scanned into Media Yes    
09-Aug-2017 06:50

## 2024-05-23 LAB — C DIFF TOX GENS STL QL NAA+PROBE: NEGATIVE

## 2024-05-24 ENCOUNTER — TELEPHONE (OUTPATIENT)
Dept: GASTROENTEROLOGY | Facility: CLINIC | Age: 74
End: 2024-05-24

## 2024-05-24 NOTE — TELEPHONE ENCOUNTER
----- Message from Jorge Sanchez DO sent at 5/24/2024  6:26 AM EDT -----  Please call the patient and make her aware that her stool test for C. difficile was negative.

## 2025-03-20 ENCOUNTER — OFFICE VISIT (OUTPATIENT)
Dept: GASTROENTEROLOGY | Facility: CLINIC | Age: 75
End: 2025-03-20
Payer: COMMERCIAL

## 2025-03-20 VITALS
WEIGHT: 173.8 LBS | DIASTOLIC BLOOD PRESSURE: 78 MMHG | HEART RATE: 80 BPM | HEIGHT: 64 IN | OXYGEN SATURATION: 95 % | BODY MASS INDEX: 29.67 KG/M2 | SYSTOLIC BLOOD PRESSURE: 118 MMHG | TEMPERATURE: 97.2 F

## 2025-03-20 DIAGNOSIS — R15.9 FULL INCONTINENCE OF FECES: ICD-10-CM

## 2025-03-20 DIAGNOSIS — R19.8 IRREGULAR BOWEL HABITS: ICD-10-CM

## 2025-03-20 DIAGNOSIS — K58.0 IRRITABLE BOWEL SYNDROME WITH DIARRHEA: Primary | ICD-10-CM

## 2025-03-20 DIAGNOSIS — R19.4 CHANGE IN BOWEL HABITS: ICD-10-CM

## 2025-03-20 PROCEDURE — 99213 OFFICE O/P EST LOW 20 MIN: CPT | Performed by: PHYSICIAN ASSISTANT

## 2025-03-20 RX ORDER — COLESTIPOL HYDROCHLORIDE 1 G/1
1 TABLET ORAL 2 TIMES DAILY
Qty: 60 TABLET | Refills: 3 | Status: SHIPPED | OUTPATIENT
Start: 2025-03-20

## 2025-03-20 RX ORDER — LISINOPRIL 30 MG/1
1 TABLET ORAL DAILY
COMMUNITY
Start: 2025-03-02

## 2025-03-20 NOTE — PROGRESS NOTES
Name: Suzan Fernandez      : 1950      MRN: [id number]  Encounter Provider: Rosalba Valencia PA-C  Encounter Date: 3/20/2025   Encounter department: [facility] [place]  :  Assessment & Plan  Irregular bowel habits  Long history of constipation which became associated with loose stools several years ago  She still fluctuates between hard and soft stools  She is taking Lotronex 0.5mg daily  Recently she notes incontinence episodes which are unpredictable - this can be soft or formed stools  She notes lower abdominal pressure    CT AP from May of last year noted mild fecal retention more consistent with constipation    Advised fiber supplement  Advised increasing water intake (she typically on drinks soda)  Advised improving diet - for the most part she eats fast food and TV dinners    Given change in bowels will plan colonoscopy - last exam in  with 2 polyps removed    Stop Lotronex  Try Colestipol 1-2 times per day - advised to dose apart from other medications    Ultimately we discussed that dietary changes will offer the greatest benefit       Full incontinence of feces             History of Present Illness   HPI  Suzan Fernandez is a 74 y.o. female with a history of COPD, GERD and hypertension who presents for follow-up evaluation of irregular bowel movements.  She reports that for most of her life she has been extremely constipated.  She reports she will have bowel movements about once a week.  Several years ago she started to have constipation intermixed with loose stools.  She came in to see Dr. Sanchez last year and was describing diarrhea.  He prescribed Lotronex.  The prescription was written a 0.5 mg twice a day but she has only been taking it once a day.  She reports that since starting this medication she is having hard stools mixed with soft stools.  More recently within the past 4 months she is reporting incontinence.  She reports she has had several  episodes where unbeknownst to her she will pass stool into her underwear and she will not even realize it until she uses the bathroom again.  She reports lower abdominal pain and pressure.  Unfortunately, she has a very poor diet.  She typically eats only fast food and TV dinners.  She admits that she drinks no water and only drinks soda.  She is understanding of how this is problematic to her bowel function.  Her last colonoscopy was in 2021 with 2 polyps removed.  One was a tubular adenoma.  She was advised to follow-up in 5 years for repeat exam.        Review of Systems  Medical History Reviewed by provider this encounter:     .  Past Medical History   Past Medical History:   Diagnosis Date    COPD (chronic obstructive pulmonary disease) (HCC)     GERD (gastroesophageal reflux disease)     Hypertension      Past Surgical History:   Procedure Laterality Date    CARPAL TUNNEL RELEASE      CHOLECYSTECTOMY      HYSTERECTOMY      KNEE ARTHROSCOPY      left elbow    REVISION TOTAL HIP ARTHROPLASTY Left     TOTAL HIP ARTHROPLASTY Right     9/17/24 left 2 year ago     Family History   Problem Relation Age of Onset    Hypertension Mother     Heart disease Mother       reports that she has quit smoking. She has never used smokeless tobacco. She reports that she does not currently use alcohol. She reports that she does not currently use drugs.  Current Outpatient Medications   Medication Instructions    albuterol (PROVENTIL HFA,VENTOLIN HFA) 90 mcg/act inhaler 1 puff, Every 6 hours PRN    alosetron (LOTRONEX) 0.5 mg, Oral, 2 times daily    benzonatate (TESSALON PERLES) 100 mg, 3 times daily PRN    celecoxib (CELEBREX) 200 mg, Daily    Cholecalciferol (VITAMIN D3) 5000 units CAPS 1 capsule, Daily    colestipol (COLESTID) 1 g, Oral, 2 times daily    lisinopril (ZESTRIL) 30 mg tablet 1 tablet, Daily    lisinopril (ZESTRIL) 10 mg, Daily     Allergies   Allergen Reactions    Acetazolamide Other (See Comments)    Amoxicillin  Hives    Sulfa Antibiotics Hives      Current Outpatient Medications on File Prior to Visit   Medication Sig Dispense Refill    albuterol (PROVENTIL HFA,VENTOLIN HFA) 90 mcg/act inhaler Inhale 1 puff every 6 (six) hours as needed      alosetron (LOTRONEX) 0.5 MG tablet Take 1 tablet (0.5 mg total) by mouth 2 (two) times a day 62 tablet 3    benzonatate (TESSALON PERLES) 100 mg capsule Take 100 mg by mouth 3 (three) times a day as needed for cough Prn      celecoxib (CeleBREX) 200 mg capsule Take 200 mg by mouth daily      Cholecalciferol (VITAMIN D3) 5000 units CAPS Take 1 capsule by mouth daily      lisinopril (ZESTRIL) 10 mg tablet Take 10 mg by mouth daily Taking 30 mg daily      lisinopril (ZESTRIL) 30 mg tablet Take 1 tablet by mouth in the morning      [DISCONTINUED] lubiprostone (AMITIZA) 8 mcg capsule Take 1 capsule (8 mcg total) by mouth 2 (two) times a day with meals 60 capsule 11    [DISCONTINUED] amLODIPine-benazepril (LOTREL 5-10) 5-10 MG per capsule Take 1 capsule by mouth daily (Patient not taking: Reported on 5/22/2024)      [DISCONTINUED] dicyclomine (BENTYL) 20 mg tablet Take 1 tablet (20 mg total) by mouth every 6 (six) hours as needed (abdominal pain) (Patient not taking: Reported on 5/22/2024) 60 tablet 3    [DISCONTINUED] gabapentin (NEURONTIN) 100 mg capsule Take 100 mg by mouth prn (Patient not taking: Reported on 3/20/2025)      [DISCONTINUED] omeprazole (PriLOSEC) 40 MG capsule Take 1 capsule (40 mg total) by mouth 2 (two) times a day (Patient not taking: Reported on 3/20/2025) 60 capsule 11    [DISCONTINUED] omeprazole (PriLOSEC) 40 MG capsule Take 1 capsule (40 mg total) by mouth daily (Patient not taking: Reported on 3/20/2025) 31 capsule 6    [DISCONTINUED] traMADol (ULTRAM) 50 mg tablet prn (Patient not taking: Reported on 3/20/2025)       No current facility-administered medications on file prior to visit.      Social History     Tobacco Use    Smoking status: Former    Smokeless  "tobacco: Never   Vaping Use    Vaping status: Never Used   Substance and Sexual Activity    Alcohol use: Not Currently    Drug use: Not Currently    Sexual activity: Not Currently        Objective   /78 (BP Location: Left arm, Patient Position: Sitting, Cuff Size: Standard)   Pulse 80   Temp (!) 97.2 °F (36.2 °C) (Temporal)   Ht 5' 4\" (1.626 m)   Wt 78.8 kg (173 lb 12.8 oz)   SpO2 95%   BMI 29.83 kg/m²      Physical Exam  Vitals reviewed.   Constitutional:       Appearance: Normal appearance.   HENT:      Head: Normocephalic and atraumatic.   Eyes:      Extraocular Movements: Extraocular movements intact.      Pupils: Pupils are equal, round, and reactive to light.   Cardiovascular:      Rate and Rhythm: Normal rate and regular rhythm.   Abdominal:      General: Bowel sounds are normal. There is no distension.      Palpations: Abdomen is soft. There is no mass.      Tenderness: There is no abdominal tenderness.   Skin:     General: Skin is warm and dry.      Coloration: Skin is not jaundiced.   Neurological:      General: No focal deficit present.      Mental Status: She is alert and oriented to person, place, and time.           "

## 2025-03-20 NOTE — PATIENT INSTRUCTIONS
Scheduled date of colonoscopy (as of today):3/27/25  Physician performing colonoscopy:Daniel  Location of colonoscopy:[place]  Bowel prep reviewed with patient:Alfredo/Miralax  Instructions reviewed with patient by:Sukhjinder schaeffer  Clearances:none

## 2025-03-27 ENCOUNTER — ANESTHESIA (OUTPATIENT)
Dept: GASTROENTEROLOGY | Facility: HOSPITAL | Age: 75
End: 2025-03-27
Payer: COMMERCIAL

## 2025-03-27 ENCOUNTER — ANESTHESIA EVENT (OUTPATIENT)
Dept: GASTROENTEROLOGY | Facility: HOSPITAL | Age: 75
End: 2025-03-27
Payer: COMMERCIAL

## 2025-03-27 ENCOUNTER — HOSPITAL ENCOUNTER (OUTPATIENT)
Dept: GASTROENTEROLOGY | Facility: HOSPITAL | Age: 75
Setting detail: OUTPATIENT SURGERY
End: 2025-03-27
Payer: COMMERCIAL

## 2025-03-27 VITALS
SYSTOLIC BLOOD PRESSURE: 122 MMHG | WEIGHT: 169.31 LBS | TEMPERATURE: 97.4 F | RESPIRATION RATE: 20 BRPM | BODY MASS INDEX: 28.91 KG/M2 | DIASTOLIC BLOOD PRESSURE: 60 MMHG | HEART RATE: 70 BPM | OXYGEN SATURATION: 97 % | HEIGHT: 64 IN

## 2025-03-27 DIAGNOSIS — R19.4 CHANGE IN BOWEL HABITS: ICD-10-CM

## 2025-03-27 PROCEDURE — 45378 DIAGNOSTIC COLONOSCOPY: CPT | Performed by: INTERNAL MEDICINE

## 2025-03-27 RX ORDER — LIDOCAINE HYDROCHLORIDE 20 MG/ML
INJECTION, SOLUTION EPIDURAL; INFILTRATION; INTRACAUDAL; PERINEURAL AS NEEDED
Status: DISCONTINUED | OUTPATIENT
Start: 2025-03-27 | End: 2025-03-27

## 2025-03-27 RX ORDER — SODIUM CHLORIDE, SODIUM LACTATE, POTASSIUM CHLORIDE, CALCIUM CHLORIDE 600; 310; 30; 20 MG/100ML; MG/100ML; MG/100ML; MG/100ML
75 INJECTION, SOLUTION INTRAVENOUS CONTINUOUS
Status: DISPENSED | OUTPATIENT
Start: 2025-03-27

## 2025-03-27 RX ORDER — PROPOFOL 10 MG/ML
INJECTION, EMULSION INTRAVENOUS AS NEEDED
Status: DISCONTINUED | OUTPATIENT
Start: 2025-03-27 | End: 2025-03-27

## 2025-03-27 RX ADMIN — SODIUM CHLORIDE, SODIUM LACTATE, POTASSIUM CHLORIDE, AND CALCIUM CHLORIDE 75 ML/HR: .6; .31; .03; .02 INJECTION, SOLUTION INTRAVENOUS at 07:32

## 2025-03-27 RX ADMIN — PROPOFOL 40 MG: 10 INJECTION, EMULSION INTRAVENOUS at 09:05

## 2025-03-27 RX ADMIN — PROPOFOL 130 MG: 10 INJECTION, EMULSION INTRAVENOUS at 08:52

## 2025-03-27 RX ADMIN — PROPOFOL 30 MG: 10 INJECTION, EMULSION INTRAVENOUS at 08:59

## 2025-03-27 RX ADMIN — LIDOCAINE HYDROCHLORIDE 100 MG: 20 INJECTION, SOLUTION EPIDURAL; INFILTRATION; INTRACAUDAL; PERINEURAL at 08:52

## 2025-03-27 RX ADMIN — PROPOFOL 30 MG: 10 INJECTION, EMULSION INTRAVENOUS at 09:02

## 2025-03-27 RX ADMIN — PROPOFOL 30 MG: 10 INJECTION, EMULSION INTRAVENOUS at 08:55

## 2025-03-27 RX ADMIN — PROPOFOL 30 MG: 10 INJECTION, EMULSION INTRAVENOUS at 09:08

## 2025-03-27 NOTE — ANESTHESIA POSTPROCEDURE EVALUATION
Post-Op Assessment Note    CV Status:  Stable  Pain Score: 0    Pain management: adequate       Mental Status:  Sleepy   Hydration Status:  Stable   PONV Controlled:  None   Airway Patency:  Patent  Two or more mitigation strategies used for obstructive sleep apnea   Post Op Vitals Reviewed: Yes    No anethesia notable event occurred.            Last Filed PACU Vitals:  Vitals Value Taken Time   Temp     Pulse     BP     Resp     SpO2

## 2025-03-27 NOTE — H&P
"History and Physical -  [facility]  Suzan Fernandez 74 y.o. female MRN: [id number]      HPI: Suzan Fernandez is a 74 y.o. year old female who presents for evaluation of alternating bowel habits.      REVIEW OF SYSTEMS: Per the HPI, and otherwise unremarkable.    Historical Information   Past Medical History:   Diagnosis Date    Colon polyp     COPD (chronic obstructive pulmonary disease) (HCC)     GERD (gastroesophageal reflux disease)     Hypertension      Past Surgical History:   Procedure Laterality Date    CARPAL TUNNEL RELEASE      CHOLECYSTECTOMY      HYSTERECTOMY      KNEE ARTHROSCOPY      left elbow    REVISION TOTAL HIP ARTHROPLASTY Left     TOTAL HIP ARTHROPLASTY Right     9/17/24 left 2 year ago     Social History   Social History     Substance and Sexual Activity   Alcohol Use Not Currently     Social History     Substance and Sexual Activity   Drug Use Not Currently     Social History     Tobacco Use   Smoking Status Former   Smokeless Tobacco Never     Family History   Problem Relation Age of Onset    Hypertension Mother     Heart disease Mother        Meds/Allergies     Not in a hospital admission.    Allergies   Allergen Reactions    Acetazolamide Other (See Comments)    Amoxicillin Hives    Sulfa Antibiotics Hives       Objective     Blood pressure 118/64, pulse 82, temperature 98.9 °F (37.2 °C), temperature source Temporal, resp. rate 18, height 5' 4\" (1.626 m), weight 76.8 kg (169 lb 5 oz), SpO2 98%.      PHYSICAL EXAM    Gen: NAD  CV: RRR  CHEST: Clear  ABD: soft, NT/ND  EXT: no edema      ASSESSMENT/PLAN:  This is a 74 y.o. year old female here for colonoscopy, and she is stable and optimized for her procedure.          "

## 2025-03-27 NOTE — ANESTHESIA PREPROCEDURE EVALUATION
Procedure:  COLONOSCOPY    Relevant Problems   CARDIO   (+) Hypertension      PULMONARY   (+) COPD (chronic obstructive pulmonary disease) (HCC)        Physical Exam    Airway    Mallampati score: I         Dental    upper dentures,     Cardiovascular  Cardiovascular exam normal    Pulmonary  Pulmonary exam normal     Other Findings  post-pubertal.      Anesthesia Plan  ASA Score- 2     Anesthesia Type- IV sedation with anesthesia with ASA Monitors.         Additional Monitors:     Airway Plan:            Plan Factors-Exercise tolerance (METS): <4 METS.    Chart reviewed. EKG reviewed. Imaging results reviewed. Existing labs reviewed. Patient summary reviewed.    Patient is not a current smoker.              Induction- intravenous.    Postoperative Plan-     Perioperative Resuscitation Plan - Level 1 - Full Code.       Informed Consent- Anesthetic plan and risks discussed with patient.  I personally reviewed this patient with the CRNA. Discussed and agreed on the Anesthesia Plan with the CRNA..      NPO Status:  Vitals Value Taken Time   Date of last liquid 03/27/25 03/27/25 0723   Time of last liquid 0645 03/27/25 0723   Date of last solid 03/25/25 03/27/25 0723   Time of last solid 1800 03/27/25 0723

## 2025-04-01 ENCOUNTER — TELEPHONE (OUTPATIENT)
Age: 75
End: 2025-04-01

## 2025-04-01 ENCOUNTER — OFFICE VISIT (OUTPATIENT)
Age: 75
End: 2025-04-01
Payer: COMMERCIAL

## 2025-04-01 VITALS
BODY MASS INDEX: 28.85 KG/M2 | SYSTOLIC BLOOD PRESSURE: 149 MMHG | WEIGHT: 169 LBS | DIASTOLIC BLOOD PRESSURE: 90 MMHG | HEART RATE: 87 BPM | OXYGEN SATURATION: 97 % | HEIGHT: 64 IN

## 2025-04-01 DIAGNOSIS — N81.6 RECTOCELE: Primary | ICD-10-CM

## 2025-04-01 PROCEDURE — 99203 OFFICE O/P NEW LOW 30 MIN: CPT | Performed by: COLON & RECTAL SURGERY

## 2025-04-01 NOTE — PROGRESS NOTES
Assessment/Plan:  Patient is digital rectal exam reveals rectocele.  Patient sphincter tone is appropriate for age.    Plan: Patient will be referred to urogynecology for evaluation repair of her rectocele.    Patient advised to begin a high-fiber diet with fiber replacement Citrucel 2 tablets p.o. twice daily       Diagnoses and all orders for this visit:    Rectocele  -     Ambulatory Referral to Urogynecology; Future              Subjective:      Patient ID: Suzan Fernandez is a 74 y.o. female.    HPI  Occasional passage of formed stool per rectum.  The patient has had 2 deliveries one 8 pound child the other 10 pound child vaginally.  The patient states that when she goes to the bathroom she feels that the stool gets stuck in the lower part of the rectum and her rectum does not completely empty.  The patient has had recent colonoscopic per Dr. Sanchez 3/20/2025, evaluation results reviewed:    The terminal ileum, ileocecal valve, appendiceal orifice, cecum, ascending colon, hepatic flexure, transverse colon, splenic flexure, descending colon, rectosigmoid, rectum and anal region appeared normal.  Diverticulosis of moderate severity in the proximal sigmoid colon, mid sigmoid colon and distal sigmoid colon        The following portions of the patient's history were reviewed and updated as appropriate:   She  has a past medical history of Colon polyp, COPD (chronic obstructive pulmonary disease) (HCC), GERD (gastroesophageal reflux disease), and Hypertension.  She   Patient Active Problem List    Diagnosis Date Noted    Hypertension 11/02/2017    COPD (chronic obstructive pulmonary disease) (HCC) 11/01/2017     She  has a past surgical history that includes Hysterectomy; Cholecystectomy; Carpal tunnel release; Knee arthroscopy; Revision total hip arthroplasty (Left); and Total hip arthroplasty (Right).  Her family history includes Heart disease in her mother; Hypertension in her mother.  She  reports that she has  quit smoking. She has never used smokeless tobacco. She reports that she does not currently use alcohol. She reports that she does not currently use drugs.  Current Outpatient Medications   Medication Sig Dispense Refill    albuterol (PROVENTIL HFA,VENTOLIN HFA) 90 mcg/act inhaler Inhale 1 puff every 6 (six) hours as needed      alosetron (LOTRONEX) 0.5 MG tablet Take 1 tablet (0.5 mg total) by mouth 2 (two) times a day 62 tablet 3    benzonatate (TESSALON PERLES) 100 mg capsule Take 100 mg by mouth 3 (three) times a day as needed for cough Prn      celecoxib (CeleBREX) 200 mg capsule Take 200 mg by mouth daily      Cholecalciferol (VITAMIN D3) 5000 units CAPS Take 1 capsule by mouth daily      colestipol (COLESTID) 1 g tablet Take 1 tablet (1 g total) by mouth 2 (two) times a day 60 tablet 3    lisinopril (ZESTRIL) 30 mg tablet Take 1 tablet by mouth in the morning       No current facility-administered medications for this visit.     Current Outpatient Medications on File Prior to Visit   Medication Sig    albuterol (PROVENTIL HFA,VENTOLIN HFA) 90 mcg/act inhaler Inhale 1 puff every 6 (six) hours as needed    alosetron (LOTRONEX) 0.5 MG tablet Take 1 tablet (0.5 mg total) by mouth 2 (two) times a day    benzonatate (TESSALON PERLES) 100 mg capsule Take 100 mg by mouth 3 (three) times a day as needed for cough Prn    celecoxib (CeleBREX) 200 mg capsule Take 200 mg by mouth daily    Cholecalciferol (VITAMIN D3) 5000 units CAPS Take 1 capsule by mouth daily    colestipol (COLESTID) 1 g tablet Take 1 tablet (1 g total) by mouth 2 (two) times a day    lisinopril (ZESTRIL) 30 mg tablet Take 1 tablet by mouth in the morning     No current facility-administered medications on file prior to visit.     She is allergic to acetazolamide, amoxicillin, and sulfa antibiotics..    Review of Systems   Constitutional:  Negative for chills and fever.   HENT:  Negative for ear pain and sore throat.    Eyes:  Negative for pain and  "visual disturbance.   Respiratory:  Negative for cough and shortness of breath.    Cardiovascular:  Negative for chest pain and palpitations.   Gastrointestinal:  Negative for abdominal pain and vomiting.   Genitourinary:  Negative for dysuria and hematuria.   Musculoskeletal:  Negative for arthralgias and back pain.   Skin:  Negative for color change and rash.   Neurological:  Negative for seizures and syncope.   All other systems reviewed and are negative.        Objective:      /90   Pulse 87   Ht 5' 4\" (1.626 m)   Wt 76.7 kg (169 lb)   SpO2 97%   BMI 29.01 kg/m²          Physical Exam   Constitutional: She is oriented to person, place, and time.   Abdominal: Normal appearance.   Neurological: She is oriented to person, place, and time.   Nursing note and vitals reviewed.   Colorectal exam:  Inspection of the anal margin reveals anal skin tags.    Digital rectal exam reveals sphincteric tone for the patient's age.    The patient is with an anterior rectocele.  "

## 2025-04-01 NOTE — TELEPHONE ENCOUNTER
Please advise:    Pt calling in saying that is trying to scheduled with Naya Gomez MD, Urogynecology for rectocele and the phone number she was given will not connect her without an extension number that she does not have. This RN attempted a warm transfer to that number but was unable to get through as well.      Phone number pt has and what is on [employer]: [phone]

## 2025-04-16 ENCOUNTER — TELEPHONE (OUTPATIENT)
Age: 75
End: 2025-04-16

## 2025-04-16 DIAGNOSIS — N81.6 RECTOCELE: Primary | ICD-10-CM

## 2025-04-16 NOTE — TELEPHONE ENCOUNTER
Patients GI provider:  Dr. Berrios    Number to return call: [phone]    Reason for call: Pt calling a bit frustrated. She called Dr. Molina's office w/ ph # I gave her and they cannot get pt in sooner than July. Pt stated she was given other ph # and no one can get her in soon. Pt requesting that Dr. Berrios give her another referral preferably a [facility] provider since she is getting confused w/ different providers. Pt also requesting help w/ scheduling this referral appt w/ urogynecology w/ her please. Thank you    Scheduled procedure/appointment date if applicable: N/A

## 2025-04-16 NOTE — TELEPHONE ENCOUNTER
Pt calling stating she is scheduled in July w/ Dr. Molina urogynecology Dr. Berrios refer her to. She had a phone # that needed an extension. I found Dr. Jose Maria LIU urogynecology doctor and gave her that ph # [phone]. Per pt thank you.    (Pt also stated that she was calling Dr. Naya Gomez that Dr. Berrios referred as well at [phone] and it keeps asking for an extension and if you don't have one it will hang up. I tried calling for pt and she is correct this is the phone # for Dr. Gomez online and it is not a good ph #. Pt is frustrated)